# Patient Record
Sex: MALE | Race: WHITE | Employment: OTHER | URBAN - METROPOLITAN AREA
[De-identification: names, ages, dates, MRNs, and addresses within clinical notes are randomized per-mention and may not be internally consistent; named-entity substitution may affect disease eponyms.]

---

## 2017-02-22 ENCOUNTER — TELEPHONE (OUTPATIENT)
Dept: INTERNAL MEDICINE CLINIC | Facility: CLINIC | Age: 77
End: 2017-02-22

## 2017-02-27 RX ORDER — LORAZEPAM 1 MG/1
1 TABLET ORAL EVERY 4 HOURS PRN
Qty: 90 TABLET | Refills: 0 | Status: SHIPPED | OUTPATIENT
Start: 2017-02-27

## 2017-03-06 ENCOUNTER — TELEPHONE (OUTPATIENT)
Dept: INTERNAL MEDICINE CLINIC | Facility: CLINIC | Age: 77
End: 2017-03-06

## 2017-06-08 ENCOUNTER — OFFICE VISIT (OUTPATIENT)
Dept: INTERNAL MEDICINE CLINIC | Facility: CLINIC | Age: 77
End: 2017-06-08

## 2017-06-08 VITALS
WEIGHT: 190 LBS | OXYGEN SATURATION: 98 % | HEART RATE: 103 BPM | BODY MASS INDEX: 30.17 KG/M2 | SYSTOLIC BLOOD PRESSURE: 140 MMHG | TEMPERATURE: 98 F | RESPIRATION RATE: 19 BRPM | HEIGHT: 66.5 IN | DIASTOLIC BLOOD PRESSURE: 80 MMHG

## 2017-06-08 DIAGNOSIS — J30.1 SEASONAL ALLERGIC RHINITIS DUE TO POLLEN: ICD-10-CM

## 2017-06-08 DIAGNOSIS — E88.81 METABOLIC SYNDROME: ICD-10-CM

## 2017-06-08 DIAGNOSIS — E78.1 HYPERTRIGLYCERIDEMIA: ICD-10-CM

## 2017-06-08 DIAGNOSIS — I10 ESSENTIAL HYPERTENSION WITH GOAL BLOOD PRESSURE LESS THAN 140/90: Primary | ICD-10-CM

## 2017-06-08 DIAGNOSIS — K21.9 GERD WITHOUT ESOPHAGITIS: ICD-10-CM

## 2017-06-08 DIAGNOSIS — Z23 NEED FOR VACCINATION FOR STREP PNEUMONIAE: ICD-10-CM

## 2017-06-08 PROCEDURE — 99214 OFFICE O/P EST MOD 30 MIN: CPT | Performed by: INTERNAL MEDICINE

## 2017-06-08 PROCEDURE — 90670 PCV13 VACCINE IM: CPT | Performed by: INTERNAL MEDICINE

## 2017-06-08 PROCEDURE — G0009 ADMIN PNEUMOCOCCAL VACCINE: HCPCS | Performed by: INTERNAL MEDICINE

## 2017-06-08 RX ORDER — AZELASTINE HCL 205.5 UG/1
SPRAY NASAL
Qty: 30 ML | Refills: 1 | Status: SHIPPED | OUTPATIENT
Start: 2017-06-08

## 2017-06-08 RX ORDER — MONTELUKAST SODIUM 10 MG/1
10 TABLET ORAL NIGHTLY
Qty: 90 TABLET | Refills: 3 | Status: SHIPPED | OUTPATIENT
Start: 2017-06-08

## 2017-06-08 NOTE — PROGRESS NOTES
PREVNAR  injection/vaccine  0.5 ML administered IM To the RT DELTOID. Vaccine/injection ordered by physician and documented within chart. Per physician able to administer vaccine/injection. Pt tolerated well. VIS provided and pt had no further questions.  D

## 2017-06-09 PROBLEM — E88.81 METABOLIC SYNDROME: Status: ACTIVE | Noted: 2017-06-09

## 2017-06-09 PROBLEM — E78.1 HYPERTRIGLYCERIDEMIA: Status: ACTIVE | Noted: 2017-06-09

## 2017-06-09 PROBLEM — E88.810 METABOLIC SYNDROME: Status: ACTIVE | Noted: 2017-06-09

## 2017-06-10 NOTE — PROGRESS NOTES
HPI:    Patient ID: Myles Bradford is a 68year old male. HPI  Here for f/u HTN, hay fever, GERD. Home BP : 156/93, 152/96, 133/97. Had been non-compliant with diet. Reports no chest pain, dizzy spells, claudication. Compliant with meds.  .     Nasal 40 mg by mouth daily. Disp:  Rfl:      Allergies:No Known Allergies   PHYSICAL EXAM:   Physical Exam   Constitutional: He is oriented to person, place, and time. He appears well-developed. HENT:   Head: Normocephalic.    Mouth/Throat: Oropharynx is swati Montelukast Sodium 10 MG Oral Tab 90 tablet 3      Sig: Take 1 tablet (10 mg total) by mouth nightly.       Azelastine HCl (ASTEPRO) 0.15 % Nasal Solution 30 mL 1      Si spray to each nostril daily           Imaging & Referrals:  PNEUMOCOCCAL VACC, 13

## 2017-11-22 ENCOUNTER — OFFICE VISIT (OUTPATIENT)
Dept: INTERNAL MEDICINE CLINIC | Facility: CLINIC | Age: 77
End: 2017-11-22

## 2017-11-22 DIAGNOSIS — M15.9 PRIMARY OSTEOARTHRITIS INVOLVING MULTIPLE JOINTS: ICD-10-CM

## 2017-11-22 DIAGNOSIS — E78.1 HYPERTRIGLYCERIDEMIA: ICD-10-CM

## 2017-11-22 DIAGNOSIS — Z00.00 ENCOUNTER FOR ANNUAL HEALTH EXAMINATION: Primary | ICD-10-CM

## 2017-11-22 DIAGNOSIS — I10 ESSENTIAL HYPERTENSION: ICD-10-CM

## 2017-11-22 DIAGNOSIS — Z12.5 PROSTATE CANCER SCREENING: ICD-10-CM

## 2017-11-22 PROCEDURE — 36415 COLL VENOUS BLD VENIPUNCTURE: CPT | Performed by: INTERNAL MEDICINE

## 2017-11-22 PROCEDURE — 80061 LIPID PANEL: CPT | Performed by: INTERNAL MEDICINE

## 2017-11-22 PROCEDURE — G0439 PPPS, SUBSEQ VISIT: HCPCS | Performed by: INTERNAL MEDICINE

## 2017-11-22 PROCEDURE — 85025 COMPLETE CBC W/AUTO DIFF WBC: CPT | Performed by: INTERNAL MEDICINE

## 2017-11-22 PROCEDURE — 80053 COMPREHEN METABOLIC PANEL: CPT | Performed by: INTERNAL MEDICINE

## 2017-11-22 NOTE — PROGRESS NOTES
HPI:   Myles Bradford is a 68year old male who presents for a Medicare Subsequent Annual Wellness visit (Pt already had Initial Annual Wellness). No major complaints except for artthralgia. Denies chest pain. Sob.   Has chronic deafness, use hearing aids 40 MG Oral Capsule Delayed Release Take 40 mg by mouth daily. MEDICAL INFORMATION:   He  has a past medical history of Essential hypertension and Osteoarthritis. He  has no past surgical history on file.     His family history includes Hypertensio septum midline, mucosa normal, no drainage or sinus tenderness   Throat: Lips, mucosa, and tongue normal; teeth and gums normal   Neck: Supple, symmetrical, trachea midline, no adenopathy, thyroid: not enlarged, symmetric, no tenderness/mass/nodules, no ca heavy meals. No food 3-4 hs before bedtime. Keep HOB elevated during sleep. Taper PPI when feeling better      Hyperlipidemia/metabolic syndrome  Keep LDL < 100, trug < 150  On simvastatin, Low fat, low carb diet.  Weight loss beneficial      Anxiety  Psyc Problems?: No      Fall/Risk Assessment          Have you fallen in the last 12 months?: 0-No                                        Fall/Risk Scorin          Depression Screening (PHQ-2/PHQ-9): Over the LAST 2 WEEKS   Little interest or pleasure in do AA>50, > 65 No flowsheet data found.     Prostate Cancer Screening      PSA  Annually Psa today Update Health Maintenance if applicable   Immunizations      Influenza   Orders placed or performed in visit on 09/29/16  -FLU VACC PRSV FREE INC ANTIG

## 2017-11-22 NOTE — PATIENT INSTRUCTIONS
Kendrick Valentin's SCREENING SCHEDULE   Tests on this list are recommended by your physician but may not be covered, or covered at this frequency, by your insurer. Please check with your insurance carrier before scheduling to verify coverage.     PREVENTATIV Colonoscopy Screen   Covered every 10 years- more often if abnormal There are no preventive care reminders to display for this patient.  Update Health Maintenance if applicable    Flex Sigmoidoscopy Screen  Covered every 5 years No results found for [place] with a cut with metal- TD and TDaP Not covered by Medicare Part B) No orders found for this or any previous visit.  This may be covered with your prescription benefits, but Medicare does not cover unless Medically needed    Zoster (Not covered by Medicare P Covered at least every 3 years,           GLUCOSE (P) (mg/dL)   Date Value   09/29/2016 65 (L)   ---------- Medicare covers annually or at 6-month intervals for prediabetic patients        Cardiovascular Disease Screening     Cholesterol, covered every 5 y are no preventive care reminders to display for this patient. No results found for this or any previous visit.    Annually (age 48 or over), DMITRY not paid separately when covered E/M service is provided on same date    Immunizations      Influenza  Covered review and print using their home computer and printer. (the forms are also available in [facility])  [email or web address] org  This link also has information from the [facility] regarding Advance Directives.

## 2017-11-24 VITALS
SYSTOLIC BLOOD PRESSURE: 135 MMHG | BODY MASS INDEX: 31.76 KG/M2 | RESPIRATION RATE: 19 BRPM | TEMPERATURE: 99 F | OXYGEN SATURATION: 98 % | HEIGHT: 66.5 IN | HEART RATE: 76 BPM | DIASTOLIC BLOOD PRESSURE: 80 MMHG | WEIGHT: 200 LBS

## 2017-11-27 PROCEDURE — G0008 ADMIN INFLUENZA VIRUS VAC: HCPCS | Performed by: INTERNAL MEDICINE

## 2017-11-27 PROCEDURE — 90653 IIV ADJUVANT VACCINE IM: CPT | Performed by: INTERNAL MEDICINE

## 2017-11-27 NOTE — PROGRESS NOTES
Patient presented in office today for fasting blood draw. Blood draw successful in left arm  Max:  Cbc,cmp,lipid,psa  D. O.B verified     Flu shot administered in left arm IM  Patient denies allergy to eggs, flu shot before, being sick today, diagnosed wi

## 2018-01-08 DIAGNOSIS — E78.5 HYPERLIPIDEMIA, UNSPECIFIED HYPERLIPIDEMIA TYPE: ICD-10-CM

## 2018-01-08 DIAGNOSIS — I10 ESSENTIAL HYPERTENSION WITH GOAL BLOOD PRESSURE LESS THAN 140/90: ICD-10-CM

## 2018-01-08 DIAGNOSIS — F41.9 ANXIETY: ICD-10-CM

## 2018-01-08 RX ORDER — SIMVASTATIN 20 MG
TABLET ORAL
Qty: 90 TABLET | Refills: 1 | Status: SHIPPED | OUTPATIENT
Start: 2018-01-08 | End: 2018-06-27

## 2018-01-08 RX ORDER — AMLODIPINE BESYLATE 5 MG/1
TABLET ORAL
Qty: 90 TABLET | Refills: 1 | Status: SHIPPED | OUTPATIENT
Start: 2018-01-08

## 2018-01-08 RX ORDER — RANITIDINE 150 MG/1
TABLET ORAL
Qty: 90 TABLET | Refills: 1 | Status: SHIPPED | OUTPATIENT
Start: 2018-01-08

## 2018-01-08 RX ORDER — TRIAMTERENE AND HYDROCHLOROTHIAZIDE 37.5; 25 MG/1; MG/1
CAPSULE ORAL
Qty: 90 CAPSULE | Refills: 1 | Status: SHIPPED | OUTPATIENT
Start: 2018-01-08

## 2018-01-09 RX ORDER — DULOXETIN HYDROCHLORIDE 60 MG/1
CAPSULE, DELAYED RELEASE ORAL
Qty: 90 CAPSULE | Refills: 0 | Status: SHIPPED | OUTPATIENT
Start: 2018-01-09 | End: 2018-02-27

## 2018-02-27 DIAGNOSIS — F41.9 ANXIETY: ICD-10-CM

## 2018-02-27 RX ORDER — DULOXETIN HYDROCHLORIDE 60 MG/1
CAPSULE, DELAYED RELEASE ORAL
Qty: 90 CAPSULE | Refills: 0 | Status: SHIPPED | OUTPATIENT
Start: 2018-02-27

## 2018-06-27 DIAGNOSIS — E78.5 HYPERLIPIDEMIA, UNSPECIFIED HYPERLIPIDEMIA TYPE: ICD-10-CM

## 2018-06-27 DIAGNOSIS — F41.9 ANXIETY: ICD-10-CM

## 2018-06-27 DIAGNOSIS — I10 ESSENTIAL HYPERTENSION WITH GOAL BLOOD PRESSURE LESS THAN 140/90: ICD-10-CM

## 2018-06-27 RX ORDER — RANITIDINE 150 MG/1
TABLET ORAL
Qty: 90 TABLET | OUTPATIENT
Start: 2018-06-27

## 2018-06-27 RX ORDER — SIMVASTATIN 20 MG
TABLET ORAL
Qty: 90 TABLET | Refills: 1 | Status: SHIPPED | OUTPATIENT
Start: 2018-06-27

## 2018-06-27 RX ORDER — TRIAMTERENE AND HYDROCHLOROTHIAZIDE 37.5; 25 MG/1; MG/1
CAPSULE ORAL
Qty: 90 CAPSULE | OUTPATIENT
Start: 2018-06-27

## 2018-06-27 RX ORDER — AMLODIPINE BESYLATE 5 MG/1
TABLET ORAL
Qty: 90 TABLET | OUTPATIENT
Start: 2018-06-27

## 2018-06-27 RX ORDER — DULOXETIN HYDROCHLORIDE 60 MG/1
CAPSULE, DELAYED RELEASE ORAL
Qty: 90 CAPSULE | OUTPATIENT
Start: 2018-06-27

## 2018-10-25 ENCOUNTER — TELEPHONE (OUTPATIENT)
Dept: INTERNAL MEDICINE CLINIC | Facility: CLINIC | Age: 78
End: 2018-10-25

## 2018-11-16 ENCOUNTER — TELEPHONE (OUTPATIENT)
Dept: INTERNAL MEDICINE CLINIC | Facility: CLINIC | Age: 78
End: 2018-11-16

## 2019-08-05 NOTE — MR AVS SNAPSHOT
[facility] at [address] [place] [address] Road [address] [phone]               Thank you for choosing us for your health care visit with Soheila Menon MD.  We are glad to serve you and happy to provide Instructions and Information about Your Health     None      Allergies as of Jun 08, 2017     No Known Allergies                Today's Vital Signs     BP Pulse Temp Height Weight BMI    140/80 mmHg 103 98.3 °F (36.8 °C) (Oral) 66.5\" 190 lb 30.21 kg/m2 - Azelastine HCl 0.15 % Soln            Immunizations Administered in the Office Today     Pneumococcal (Prevnar 13)       [employer]     Call the [employer] for assistance with your inactive [employer] account    If you have questions, you can call 21 [phone] Visit [facility] online at  [email or web address]

## 2019-12-30 RX ORDER — RANITIDINE 150 MG/1
TABLET ORAL
Qty: 90 TABLET | Refills: 1 | OUTPATIENT
Start: 2019-12-30

## 2020-10-08 ENCOUNTER — IMMUNIZATION (OUTPATIENT)
Dept: FAMILY MEDICINE CLINIC | Facility: CLINIC | Age: 80
End: 2020-10-08
Payer: MEDICARE

## 2020-10-08 PROCEDURE — G0008 ADMIN INFLUENZA VIRUS VAC: HCPCS | Performed by: NURSE PRACTITIONER

## 2020-10-08 PROCEDURE — 90662 IIV NO PRSV INCREASED AG IM: CPT | Performed by: NURSE PRACTITIONER

## 2023-08-17 ENCOUNTER — HOSPITAL ENCOUNTER (OUTPATIENT)
Age: 83
Discharge: HOME | End: 2023-08-17
Payer: MEDICARE

## 2023-08-17 ENCOUNTER — APPOINTMENT (OUTPATIENT)
Dept: GENERAL RADIOLOGY | Age: 83
End: 2023-08-17
Attending: NURSE PRACTITIONER
Payer: MEDICARE

## 2023-08-17 VITALS
HEART RATE: 100 BPM | SYSTOLIC BLOOD PRESSURE: 155 MMHG | TEMPERATURE: 97 F | RESPIRATION RATE: 20 BRPM | OXYGEN SATURATION: 96 % | DIASTOLIC BLOOD PRESSURE: 88 MMHG

## 2023-08-17 DIAGNOSIS — M79.671 RIGHT FOOT PAIN: Primary | ICD-10-CM

## 2023-08-17 LAB
#MXD IC: 1 X10ˆ3/UL (ref 0.1–1)
BUN BLD-MCNC: 31 MG/DL (ref 7–18)
CHLORIDE BLD-SCNC: 99 MMOL/L (ref 98–112)
CO2 BLD-SCNC: 27 MMOL/L (ref 21–32)
CREAT BLD-MCNC: 1.3 MG/DL
EGFRCR SERPLBLD CKD-EPI 2021: 55 ML/MIN/1.73M2 (ref 60–?)
GLUCOSE BLD-MCNC: 84 MG/DL (ref 70–99)
HCT VFR BLD AUTO: 48.2 %
HCT VFR BLD CALC: 50 %
HGB BLD-MCNC: 17.4 G/DL
ISTAT IONIZED CALCIUM FOR CHEM 8: 1.11 MMOL/L (ref 1.12–1.32)
LYMPHOCYTES # BLD AUTO: 1.5 X10ˆ3/UL (ref 1–4)
LYMPHOCYTES NFR BLD AUTO: 14.2 %
MCH RBC QN AUTO: 34.3 PG (ref 26–34)
MCHC RBC AUTO-ENTMCNC: 36.1 G/DL (ref 31–37)
MCV RBC AUTO: 94.9 FL (ref 80–100)
MIXED CELL %: 9.1 %
NEUTROPHILS # BLD AUTO: 8.4 X10ˆ3/UL (ref 1.5–7.7)
NEUTROPHILS NFR BLD AUTO: 76.7 %
PLATELET # BLD AUTO: 246 X10ˆ3/UL (ref 150–450)
POTASSIUM BLD-SCNC: 3.6 MMOL/L (ref 3.6–5.1)
RBC # BLD AUTO: 5.08 X10ˆ6/UL
SODIUM BLD-SCNC: 140 MMOL/L (ref 136–145)
URATE SERPL-MCNC: 8.5 MG/DL
WBC # BLD AUTO: 10.9 X10ˆ3/UL (ref 4–11)

## 2023-08-17 PROCEDURE — 80047 BASIC METABLC PNL IONIZED CA: CPT | Performed by: NURSE PRACTITIONER

## 2023-08-17 PROCEDURE — 85025 COMPLETE CBC W/AUTO DIFF WBC: CPT | Performed by: NURSE PRACTITIONER

## 2023-08-17 PROCEDURE — 73630 X-RAY EXAM OF FOOT: CPT | Performed by: NURSE PRACTITIONER

## 2023-08-17 PROCEDURE — 99203 OFFICE O/P NEW LOW 30 MIN: CPT | Performed by: NURSE PRACTITIONER

## 2023-08-17 RX ORDER — CETIRIZINE HYDROCHLORIDE 10 MG/1
10 TABLET ORAL DAILY
COMMUNITY

## 2023-08-17 RX ORDER — PREDNISONE 20 MG/1
40 TABLET ORAL DAILY
Qty: 10 TABLET | Refills: 0 | Status: SHIPPED | OUTPATIENT
Start: 2023-08-17 | End: 2023-08-22

## 2023-08-17 RX ORDER — DONEPEZIL HYDROCHLORIDE 5 MG/1
5 TABLET, FILM COATED ORAL NIGHTLY
COMMUNITY

## 2023-08-17 RX ORDER — LEVOTHYROXINE SODIUM 0.03 MG/1
25 TABLET ORAL
COMMUNITY

## 2023-08-17 NOTE — ED INITIAL ASSESSMENT (HPI)
Per wife pt c/o right foot pain. Pt has swelling to his great toe of his right foot. Pt has had symptoms for 2 days. Pt has more pain when walking.

## 2024-08-17 ENCOUNTER — HOSPITAL ENCOUNTER (EMERGENCY)
Facility: HOSPITAL | Age: 84
Discharge: HOME | End: 2024-08-17
Attending: EMERGENCY MEDICINE
Payer: MEDICARE

## 2024-08-17 ENCOUNTER — APPOINTMENT (OUTPATIENT)
Dept: CT IMAGING | Facility: HOSPITAL | Age: 84
End: 2024-08-17
Attending: EMERGENCY MEDICINE
Payer: MEDICARE

## 2024-08-17 VITALS
OXYGEN SATURATION: 94 % | RESPIRATION RATE: 20 BRPM | WEIGHT: 160 LBS | BODY MASS INDEX: 25.11 KG/M2 | SYSTOLIC BLOOD PRESSURE: 137 MMHG | DIASTOLIC BLOOD PRESSURE: 93 MMHG | TEMPERATURE: 98 F | HEART RATE: 104 BPM | HEIGHT: 67 IN

## 2024-08-17 DIAGNOSIS — S01.01XA LACERATION OF SCALP, INITIAL ENCOUNTER: ICD-10-CM

## 2024-08-17 DIAGNOSIS — S09.90XA INJURY OF HEAD, INITIAL ENCOUNTER: Primary | ICD-10-CM

## 2024-08-17 PROCEDURE — 72125 CT NECK SPINE W/O DYE: CPT | Performed by: EMERGENCY MEDICINE

## 2024-08-17 PROCEDURE — 70450 CT HEAD/BRAIN W/O DYE: CPT | Performed by: EMERGENCY MEDICINE

## 2024-08-17 PROCEDURE — 99284 EMERGENCY DEPT VISIT MOD MDM: CPT

## 2024-08-17 PROCEDURE — 12002 RPR S/N/AX/GEN/TRNK2.6-7.5CM: CPT

## 2024-08-17 PROCEDURE — 99285 EMERGENCY DEPT VISIT HI MDM: CPT

## 2024-08-17 RX ORDER — ZOLPIDEM TARTRATE 5 MG/1
1 TABLET ORAL NIGHTLY PRN
COMMUNITY
Start: 2024-08-14

## 2024-08-17 RX ORDER — MEMANTINE HYDROCHLORIDE 10 MG/1
10 TABLET ORAL 2 TIMES DAILY
COMMUNITY
Start: 2023-07-26

## 2024-08-17 RX ORDER — POTASSIUM CHLORIDE 1.5 G/1.58G
20 POWDER, FOR SOLUTION ORAL DAILY
COMMUNITY

## 2024-08-17 NOTE — ED INITIAL ASSESSMENT (HPI)
Patient to ED via EMS after falling at [facility] Per EMS, patient took 2 sleeping pills and was walking backwards to the bed with his walker and fell backwards hitting his head. No LOC no blood thinners.    C collar applied en route

## 2024-08-17 NOTE — ED PROVIDER NOTES
Patient Seen in: [facility] Emergency Department      History     Chief Complaint   Patient presents with    Fall     Stated Complaint: fall    Subjective:   HPI    84-year-old male comes to the hospital after having had a fall that was witnessed.  The patient has dementia and is a poor historian.  I interviewed the wife as well as the caretaker for history.  Is reported today that he was getting ready go to bed, he took a sleeping pill, was using his walker and trying to back up into bed when he lost his balance and fell hitting his head against the nightstand.  He did sustain a laceration to the posterior scalp.  He is denying any headaches.  He has no loss conscious.  He is denying any specific complaints including pain, numbness, weakness, nausea or any other complaints at this time.    Objective:   No pertinent past medical history.            No pertinent past surgical history.              No pertinent social history.            Review of Systems    Positive for stated Chief Complaint: Fall    Other systems are as noted in HPI.  Constitutional and vital signs reviewed.      All other systems reviewed and negative except as noted above.    Physical Exam     ED Triage Vitals [08/17/24 0121]   BP (!) 156/107   Pulse 108   Resp 22   Temp 97.8 °F (36.6 °C)   Temp src Temporal   SpO2 94 %   O2 Device None (Room air)       Current Vitals:   Vital Signs  BP: (!) 137/93  Pulse: 104  Resp: 20  Temp: 97.8 °F (36.6 °C)  Temp src: Temporal    Oxygen Therapy  SpO2: 94 %  O2 Device: None (Room air)            Physical Exam    HEENT : NC, posterior scalp laceration 3 cm noted, EOMI, PEERL,  neck in c-collar also mild.  Cervical muscular tenderness, no JVD, trachea midline, No LAD  Heart: S1S2 normal. No murmurs, regular rate and rhythm, nontender  Lungs: Clear to auscultation bilaterally  Abdomen: Soft nontender nondistended normal active bowel sounds without rebound, guarding or masses noted  Back nontender without  CVA tenderness  Extremity no clubbing, cyanosis or edema noted.  Full range of motion noted without tenderness  Neuro: No focal deficits noted    All measures to prevent infection transmission during my interaction with the patient were taken.  The patient was already wearing droplet mask on my arrival to the room.  Personal protective equipment including a droplet mask as well as gloves were worn throughout the duration of my exam.  Hand washing was performed prior to and after the exam.  Stethoscope and equipment used during my examination was cleaned with a super Sani cloth germicidal wipe following the exam.    ED Course   Labs Reviewed - No data to display       ED Course as of 08/17/24 0303  ------------------------------------------------------------  Time: 08/17 0300  Comment: While here the patient had a CT of the brain that I first interpreted no acute hemorrhage.  Read the radiology report as well.  The patient CT C-spine was negative and his c-collar was removed.     Laceration was anesthetized with lidocaine locally.  The wound was cleansed and irrigated copiously.  There was no visible foreign body within the wound. The wound was closed using a simple closure with staples.  The quality of the closure was excellent           MDM      Differential diagnosis include intracranial hemorrhage, cervical fracture but not limited such.  The patient's imaging here is normal.  His scalp laceration was repaired at this time the patient was discharged home with family for further care    Patient was screened and evaluated during this visit.   As a treating physician attending to the patient, I determined, within reasonable clinical confidence and prior to discharge, that an emergency medical condition was not or was no longer present.  There was no indication for further evaluation, treatment or admission on an emergency basis.       The usual and customary discharge instuctions were discussed given the patient's  ER course.  We discussed signs and symptoms that should prompt the patient's immediate return to the emergency department.   Reasonable over the counter and prescription treatment options and Physician follow up plan was discussed.       The patient is discharged in good condition.         This note was prepared using Dragon Medical voice recognition dictation software.  As a result errors may occur.  When identified to these areas have been corrected.  While every attempt is made to correct errors during dictation discrepancies may still exist.  Please contact if there are any errors.                                   Medical Decision Making      Disposition and Plan     Clinical Impression:  1. Injury of head, initial encounter    2. Laceration of scalp, initial encounter         Disposition:  Discharge  8/17/2024  3:01 am    Follow-up:  Sanket Saucedo MD  [address] [facility] [address]  [phone]    Schedule an appointment as soon as possible for a visit in 2 day(s)  Have staples removed in 7 to 10 days.          Medications Prescribed:  Current Discharge Medication List

## 2024-11-04 RX ORDER — POTASSIUM CHLORIDE 1.5 G/1.58G
20 POWDER, FOR SOLUTION ORAL DAILY
Qty: 90 PACKET | Refills: 0 | Status: SHIPPED | OUTPATIENT
Start: 2024-11-04

## 2024-11-04 NOTE — TELEPHONE ENCOUNTER
Kendrick's wife Coral called and patient needs Potassium but they need additional information concerning this. I believe they use [employer] [facility].

## 2024-11-04 NOTE — TELEPHONE ENCOUNTER
Not protocol   New pt (Coral Valentin's spouse) NOV 11/26/24   On Triamterene-hydrochlorothiazide 37.5-25 mg   Last K 4.2 on 8/29/24     Pended

## 2024-11-26 ENCOUNTER — OFFICE VISIT (OUTPATIENT)
Dept: FAMILY MEDICINE CLINIC | Facility: CLINIC | Age: 84
End: 2024-11-26
Payer: MEDICARE

## 2024-11-26 VITALS
SYSTOLIC BLOOD PRESSURE: 154 MMHG | WEIGHT: 193.5 LBS | HEIGHT: 67 IN | HEART RATE: 94 BPM | OXYGEN SATURATION: 94 % | DIASTOLIC BLOOD PRESSURE: 82 MMHG | BODY MASS INDEX: 30.37 KG/M2 | RESPIRATION RATE: 20 BRPM

## 2024-11-26 DIAGNOSIS — F41.9 ANXIETY: ICD-10-CM

## 2024-11-26 DIAGNOSIS — K21.9 GERD WITHOUT ESOPHAGITIS: ICD-10-CM

## 2024-11-26 DIAGNOSIS — R73.9 HYPERGLYCEMIA: ICD-10-CM

## 2024-11-26 DIAGNOSIS — Z87.39 HISTORY OF GOUT: ICD-10-CM

## 2024-11-26 DIAGNOSIS — M15.0 PRIMARY OSTEOARTHRITIS INVOLVING MULTIPLE JOINTS: ICD-10-CM

## 2024-11-26 DIAGNOSIS — I10 ESSENTIAL HYPERTENSION: Primary | ICD-10-CM

## 2024-11-26 DIAGNOSIS — D75.1 ERYTHROCYTOSIS: ICD-10-CM

## 2024-11-26 DIAGNOSIS — G30.9 SEVERE ALZHEIMER'S DEMENTIA WITHOUT BEHAVIORAL DISTURBANCE, PSYCHOTIC DISTURBANCE, MOOD DISTURBANCE, OR ANXIETY, UNSPECIFIED TIMING OF DEMENTIA ONSET (HCC): ICD-10-CM

## 2024-11-26 DIAGNOSIS — Z23 NEED FOR VACCINATION: ICD-10-CM

## 2024-11-26 DIAGNOSIS — F02.C0 SEVERE ALZHEIMER'S DEMENTIA WITHOUT BEHAVIORAL DISTURBANCE, PSYCHOTIC DISTURBANCE, MOOD DISTURBANCE, OR ANXIETY, UNSPECIFIED TIMING OF DEMENTIA ONSET (HCC): ICD-10-CM

## 2024-11-26 DIAGNOSIS — E78.1 HYPERTRIGLYCERIDEMIA: ICD-10-CM

## 2024-11-26 DIAGNOSIS — E03.9 ACQUIRED HYPOTHYROIDISM: ICD-10-CM

## 2024-11-26 DIAGNOSIS — J30.1 SEASONAL ALLERGIC RHINITIS DUE TO POLLEN: ICD-10-CM

## 2024-11-26 DIAGNOSIS — G62.9 PERIPHERAL POLYNEUROPATHY: ICD-10-CM

## 2024-11-26 DIAGNOSIS — E53.8 VITAMIN B12 DEFICIENCY: ICD-10-CM

## 2024-11-26 LAB
ALBUMIN SERPL-MCNC: 4.3 G/DL (ref 3.2–4.8)
ALBUMIN/GLOB SERPL: 1.3 {RATIO} (ref 1–2)
ALP LIVER SERPL-CCNC: 97 U/L
ALT SERPL-CCNC: 18 U/L
ANION GAP SERPL CALC-SCNC: 9 MMOL/L (ref 0–18)
AST SERPL-CCNC: 21 U/L (ref ?–34)
BASOPHILS # BLD AUTO: 0.06 X10(3) UL (ref 0–0.2)
BASOPHILS NFR BLD AUTO: 0.6 %
BILIRUB SERPL-MCNC: 0.3 MG/DL (ref 0.2–1.1)
BUN BLD-MCNC: 32 MG/DL (ref 9–23)
CALCIUM BLD-MCNC: 9.9 MG/DL (ref 8.7–10.4)
CHLORIDE SERPL-SCNC: 109 MMOL/L (ref 98–112)
CO2 SERPL-SCNC: 22 MMOL/L (ref 21–32)
CREAT BLD-MCNC: 1.01 MG/DL
EGFRCR SERPLBLD CKD-EPI 2021: 73 ML/MIN/1.73M2 (ref 60–?)
EOSINOPHIL # BLD AUTO: 0.26 X10(3) UL (ref 0–0.7)
EOSINOPHIL NFR BLD AUTO: 2.6 %
ERYTHROCYTE [DISTWIDTH] IN BLOOD BY AUTOMATED COUNT: 14.7 %
EST. AVERAGE GLUCOSE BLD GHB EST-MCNC: 94 MG/DL (ref 68–126)
FASTING STATUS PATIENT QL REPORTED: NO
GLOBULIN PLAS-MCNC: 3.3 G/DL (ref 2–3.5)
GLUCOSE BLD-MCNC: 112 MG/DL (ref 70–99)
HBA1C MFR BLD: 4.9 % (ref ?–5.7)
HCT VFR BLD AUTO: 44.3 %
HGB BLD-MCNC: 16.6 G/DL
IMM GRANULOCYTES # BLD AUTO: 0.04 X10(3) UL (ref 0–1)
IMM GRANULOCYTES NFR BLD: 0.4 %
LYMPHOCYTES # BLD AUTO: 1.82 X10(3) UL (ref 1–4)
LYMPHOCYTES NFR BLD AUTO: 18.1 %
MCH RBC QN AUTO: 34.7 PG (ref 26–34)
MCHC RBC AUTO-ENTMCNC: 37.5 G/DL (ref 31–37)
MCV RBC AUTO: 92.7 FL
MONOCYTES # BLD AUTO: 0.79 X10(3) UL (ref 0.1–1)
MONOCYTES NFR BLD AUTO: 7.8 %
NEUTROPHILS # BLD AUTO: 7.1 X10 (3) UL (ref 1.5–7.7)
NEUTROPHILS # BLD AUTO: 7.1 X10(3) UL (ref 1.5–7.7)
NEUTROPHILS NFR BLD AUTO: 70.5 %
OSMOLALITY SERPL CALC.SUM OF ELEC: 298 MOSM/KG (ref 275–295)
PLATELET # BLD AUTO: 312 10(3)UL (ref 150–450)
POTASSIUM SERPL-SCNC: 3.8 MMOL/L (ref 3.5–5.1)
PROT SERPL-MCNC: 7.6 G/DL (ref 5.7–8.2)
RBC # BLD AUTO: 4.78 X10(6)UL
SODIUM SERPL-SCNC: 140 MMOL/L (ref 136–145)
T4 FREE SERPL-MCNC: 1.1 NG/DL (ref 0.8–1.7)
TSI SER-ACNC: 6.18 UIU/ML (ref 0.55–4.78)
VIT B12 SERPL-MCNC: 443 PG/ML (ref 211–911)
WBC # BLD AUTO: 10.1 X10(3) UL (ref 4–11)

## 2024-11-26 PROCEDURE — 90662 IIV NO PRSV INCREASED AG IM: CPT | Performed by: FAMILY MEDICINE

## 2024-11-26 PROCEDURE — 83036 HEMOGLOBIN GLYCOSYLATED A1C: CPT | Performed by: FAMILY MEDICINE

## 2024-11-26 PROCEDURE — G0008 ADMIN INFLUENZA VIRUS VAC: HCPCS | Performed by: FAMILY MEDICINE

## 2024-11-26 PROCEDURE — 99499 UNLISTED E&M SERVICE: CPT | Performed by: FAMILY MEDICINE

## 2024-11-26 PROCEDURE — 84439 ASSAY OF FREE THYROXINE: CPT | Performed by: FAMILY MEDICINE

## 2024-11-26 PROCEDURE — 99204 OFFICE O/P NEW MOD 45 MIN: CPT | Performed by: FAMILY MEDICINE

## 2024-11-26 PROCEDURE — 80053 COMPREHEN METABOLIC PANEL: CPT | Performed by: FAMILY MEDICINE

## 2024-11-26 PROCEDURE — 82607 VITAMIN B-12: CPT | Performed by: FAMILY MEDICINE

## 2024-11-26 PROCEDURE — 85025 COMPLETE CBC W/AUTO DIFF WBC: CPT | Performed by: FAMILY MEDICINE

## 2024-11-26 PROCEDURE — 84443 ASSAY THYROID STIM HORMONE: CPT | Performed by: FAMILY MEDICINE

## 2024-11-26 RX ORDER — POTASSIUM CHLORIDE 1500 MG/1
20 TABLET, EXTENDED RELEASE ORAL DAILY
Qty: 90 TABLET | Refills: 1 | Status: SHIPPED | OUTPATIENT
Start: 2024-11-26

## 2024-11-26 RX ORDER — DONEPEZIL HYDROCHLORIDE 5 MG/1
5 TABLET, FILM COATED ORAL NIGHTLY
Qty: 30 TABLET | Refills: 3 | Status: SHIPPED | OUTPATIENT
Start: 2024-11-26

## 2024-11-26 RX ORDER — AMLODIPINE BESYLATE 10 MG/1
10 TABLET ORAL DAILY
Qty: 30 TABLET | Refills: 3 | Status: SHIPPED | OUTPATIENT
Start: 2024-11-26

## 2024-11-26 RX ORDER — ALLOPURINOL 100 MG/1
100 TABLET ORAL DAILY
COMMUNITY
Start: 2024-10-03

## 2024-11-26 RX ORDER — TRAZODONE HYDROCHLORIDE 50 MG/1
50 TABLET, FILM COATED ORAL NIGHTLY
Qty: 30 TABLET | Refills: 2 | Status: CANCELLED | OUTPATIENT
Start: 2024-11-26

## 2024-11-26 RX ORDER — PREDNISOLONE ACETATE 10 MG/ML
SUSPENSION/ DROPS OPHTHALMIC
COMMUNITY
Start: 2023-10-06 | End: 2024-11-26

## 2024-11-26 RX ORDER — MOXIFLOXACIN 5 MG/ML
SOLUTION/ DROPS OPHTHALMIC
COMMUNITY
Start: 2023-10-06 | End: 2024-11-26

## 2024-11-26 RX ORDER — DULOXETIN HYDROCHLORIDE 30 MG/1
30 CAPSULE, DELAYED RELEASE ORAL DAILY
Qty: 30 CAPSULE | Refills: 3 | Status: SHIPPED | OUTPATIENT
Start: 2024-11-26

## 2024-11-26 RX ORDER — MUPIROCIN 20 MG/G
OINTMENT TOPICAL
COMMUNITY
Start: 2024-06-20 | End: 2024-11-26

## 2024-11-27 NOTE — PROGRESS NOTES
Kendrick Valentin is a 84 year old male.  HPI:   Pt. Is here to establish care.  HTN -- on amlodipine 5 mg daily; triamterene/hydrochlorothiazide, potassium  GERD -- stable on omeprazole 20 mg daily  Gout -- on allopurinol  Neuropathy -- stable on duloxetine 60 mg daily and does not know why he is on it  Hypothyroid -- stable on levothyroxine 25 mcg daily  Dyslipidemia/hypertriglyceridemia -- on simvastatin  Allergies -- on astepro and zyrtec  and singulair  Alzheimer's -- on memantidine 10 mg BID; not taking donepezil  Erythropoiesis -- pt. Has been high for years per Coral and she states his sister has the same thing and had bone marrow bx and they did not find anything; deferred going to hematology for it  Vitamin B12 def -- stable  Insomnia -- Coral states she is not sleeping at night and will get up and go to the fridge and he needs something to sleep; she gave him her quetiapine 50 mg and it helped some but he still would get up  Meds reviewed.      Current Outpatient Medications   Medication Sig Dispense Refill    allopurinol 100 MG Oral Tab Take 1 tablet (100 mg total) by mouth daily.      omeprazole 20 MG Oral Capsule Delayed Release       potassium chloride 20 MEQ Oral Tab CR Take 1 tablet (20 mEq total) by mouth daily. 90 tablet 1    DULoxetine 30 MG Oral Cap DR Particles Take 1 capsule (30 mg total) by mouth daily. 30 capsule 3    amLODIPine 10 MG Oral Tab Take 1 tablet (10 mg total) by mouth daily. 30 tablet 3    donepezil (ARICEPT) 5 MG Oral Tab Take 1 tablet (5 mg total) by mouth nightly. 30 tablet 3    potassium chloride 20 MEQ Oral Powd Pack Take 20 mEq by mouth daily. 90 packet 0    memantine 10 MG Oral Tab Take 1 tablet (10 mg total) by mouth 2 (two) times daily.      levothyroxine 25 MCG Oral Tab Take 1 tablet (25 mcg total) by mouth before breakfast.      cetirizine 10 MG Oral Tab Take 1 tablet (10 mg total) by mouth daily.      SIMVASTATIN 20 MG Oral Tab TAKE 1 TABLET BY MOUTH  DAILY 90 tablet 1     AMLODIPINE BESYLATE 5 MG Oral Tab TAKE 1 TABLET BY MOUTH  DAILY 90 tablet 1    Azelastine HCl (ASTEPRO) 0.15 % Nasal Solution 1 spray to each nostril daily 30 mL 1    zolpidem 5 MG Oral Tab Take 1 tablet (5 mg total) by mouth nightly as needed. (Patient not taking: Reported on 11/26/2024)      TRIAMTERENE-HCTZ 37.5-25 MG Oral Cap TAKE 1 CAPSULE BY MOUTH  EVERY MORNING 90 capsule 1    Montelukast Sodium 10 MG Oral Tab Take 1 tablet (10 mg total) by mouth nightly. (Patient not taking: Reported on 11/26/2024) 90 tablet 3    LORazepam 1 MG Oral Tab Take 1 tablet (1 mg total) by mouth every 4 (four) hours as needed for Anxiety. (Patient not taking: Reported on 11/26/2024) 90 tablet 0      Allergies[1]   Past Medical History:    Dementia (HCC)    Depression    Essential hypertension    Osteoarthritis      Social History:  Social History     Socioeconomic History    Marital status:    Tobacco Use    Smoking status: Never    Smokeless tobacco: Never   Vaping Use    Vaping status: Never Used   Substance and Sexual Activity    Alcohol use: No    Drug use: No        Results for orders placed or performed during the hospital encounter of 08/17/23   Uric Acid    Collection Time: 08/17/23  4:21 PM   Result Value Ref Range    Uric Acid 8.5 (H) 3.5 - 7.2 mg/dL   POCT CBC    Collection Time: 08/17/23  4:21 PM   Result Value Ref Range    WBC IC 10.9 4.0 - 11.0 x10ˆ3/uL    RBC IC 5.08 3.80 - 5.80 X10ˆ6/uL    HGB IC 17.4 13.0 - 17.5 g/dL    HCT IC 48.2 39.0 - 53.0 %    MCV IC 94.9 80.0 - 100.0 fL    MCH IC 34.3 (H) 26.0 - 34.0 pg    MCHC IC 36.1 31.0 - 37.0 g/dL    PLT .0 150.0 - 450.0 X10ˆ3/uL    # Neutrophil 8.4 (H) 1.5 - 7.7 X10ˆ3/uL    # Lymphocyte 1.5 1.0 - 4.0 X10ˆ3/uL    # Mixed Cells 1.0 0.1 - 1.0 X10ˆ3/uL    Neutrophil % 76.7 %    Lymphocyte % 14.2 %    Mixed Cell % 9.1 %   POCT ISTAT chem8 cartridge    Collection Time: 08/17/23  4:30 PM   Result Value Ref Range    ISTAT Sodium 140 136 - 145 mmol/L    ISTAT BUN 31  (H) 7 - 18 mg/dL    ISTAT Potassium 3.6 3.6 - 5.1 mmol/L    ISTAT Chloride 99 98 - 112 mmol/L    ISTAT Ionized Calcium 1.11 (L) 1.12 - 1.32 mmol/L    ISTAT Hematocrit 50 37 - 53 %    ISTAT Glucose 84 70 - 99 mg/dL    ISTAT TCO2 27 21 - 32 mmol/L    ISTAT Creatinine 1.30 0.70 - 1.30 mg/dL    eGFR-Cr 55 (L) >=60 mL/min/1.73m2       REVIEW OF SYSTEMS:   GENERAL: feels well otherwise  SKIN: denies any unusual skin lesions  EYES:denies blurred vision or double vision  HEENT: denies nasal congestion, sinus pain or ST  LUNGS: denies shortness of breath with exertion  CARDIOVASCULAR: denies chest pain on exertion  GI: denies abdominal pain,denies heartburn  : denies dysuria  MUSCULOSKELETAL: denies back pain  NEURO: denies headaches  PSYCHE: denies depression or anxiety  HEMATOLOGIC: denies hx of anemia  ENDOCRINE: denies thyroid history  ALL/ASTHMA: denies hx of allergy or asthma    EXAM:   /82 (BP Location: Left arm, Patient Position: Sitting, Cuff Size: adult)   Pulse 94   Resp 20   Ht 5' 7\" (1.702 m)   Wt 193 lb 8 oz (87.8 kg)   SpO2 94%   BMI 30.31 kg/m²   GENERAL: well developed, well nourished,in no apparent distress  PSYCHE: normal mood and affect  SKIN: no rashes,no suspicious lesions  HEENT: atraumatic, normocephalic,ears and throat are clear  NECK: supple,no adenopathy,no bruits, no thyromegaly or nodule.  LUNGS: clear to auscultation  CARDIO: RRR without murmur  GI: good BS's,no masses, HSM or tenderness  EXTREMITIES: no cyanosis, clubbing or edema    ASSESSMENT AND PLAN:     Encounter Diagnoses   Name Primary?    Essential hypertension Yes    Acquired hypothyroidism     Hyperglycemia     Vitamin B12 deficiency     Erythrocytosis     Anxiety     Seasonal allergic rhinitis due to pollen     GERD without esophagitis     Primary osteoarthritis involving multiple joints     Hypertriglyceridemia     Severe Alzheimer's dementia without behavioral disturbance, psychotic disturbance, mood disturbance, or  anxiety, unspecified timing of dementia onset (HCC)     Peripheral polyneuropathy     History of gout     Need for vaccination        Orders Placed This Encounter   Procedures    Free T4, (Free Thyroxine)    Assay, Thyroid Stim Hormone    Hemoglobin A1C    Comp Metabolic Panel (14)    CBC With Differential With Platelet    Vitamin B12    INFLUENZA VAC HIGH DOSE PRSV FREE       Meds & Refills for this Visit:  Requested Prescriptions     Signed Prescriptions Disp Refills    potassium chloride 20 MEQ Oral Tab CR 90 tablet 1     Sig: Take 1 tablet (20 mEq total) by mouth daily.    DULoxetine 30 MG Oral Cap DR Particles 30 capsule 3     Sig: Take 1 capsule (30 mg total) by mouth daily.    amLODIPine 10 MG Oral Tab 30 tablet 3     Sig: Take 1 tablet (10 mg total) by mouth daily.    donepezil (ARICEPT) 5 MG Oral Tab 30 tablet 3     Sig: Take 1 tablet (5 mg total) by mouth nightly.       Imaging & Consults:  INFLUENZA VAC HIGH DOSE PRSV FREE    Given a flu shot.  Advised COVID and shingrix.  Meds refilled.  Decrease duloxetine since not sure if it helping.  Start trazodone 50 mg nightly for sleep.    HTN -- on amlodipine 5 mg daily; triamterene/hydrochlorothiazide, potassium  -- increase amlodipine to 10 mg daily and monitor BP  GERD -- stable on omeprazole 20 mg daily  Gout -- on allopurinol; uric acid wnl  Neuropathy -- stable on duloxetine 60 mg daily and does not know why he is on it  Hypothyroid -- stable on levothyroxine 25 mcg daily  Dyslipidemia/hypertriglyceridemia -- on simvastatin  Allergies -- on astepro and zyrtec  and singulair  Alzheimer's -- on memantidine 10 mg BID; start donepezil 5 mg nightly and monitor memory  Erythropoiesis -- pt. Has been high for years per Coral and she states his sister has the same thing and had bone marrow bx and they did not find anything; deferred going to hematology for it  Vitamin B12 def -- stable  Insomnia -- will try trazodone 50 mg nightly  Monitor symptoms.        The  patient indicates understanding of these issues and agrees to the plan.  Return in about 6 weeks (around 1/7/2025) for HTN check.  .         [1]   Allergies  Allergen Reactions    Bee Venom UNKNOWN    Dust Mite Extract UNKNOWN    Seasonal UNKNOWN

## 2024-12-02 DIAGNOSIS — E03.9 HYPOTHYROIDISM, UNSPECIFIED TYPE: Primary | ICD-10-CM

## 2024-12-02 RX ORDER — LEVOTHYROXINE SODIUM 50 UG/1
50 TABLET ORAL
Qty: 90 TABLET | Refills: 0 | Status: SHIPPED | OUTPATIENT
Start: 2024-12-02

## 2024-12-30 ENCOUNTER — OFFICE VISIT (OUTPATIENT)
Dept: FAMILY MEDICINE CLINIC | Facility: CLINIC | Age: 84
End: 2024-12-30
Payer: MEDICARE

## 2024-12-30 DIAGNOSIS — Z02.9 ENCOUNTERS FOR UNSPECIFIED ADMINISTRATIVE PURPOSE: Primary | ICD-10-CM

## 2024-12-30 RX ORDER — POTASSIUM CHLORIDE 1.5 G/1.58G
POWDER, FOR SOLUTION ORAL
Qty: 90 EACH | Refills: 3 | OUTPATIENT
Start: 2024-12-30

## 2025-01-14 NOTE — TELEPHONE ENCOUNTER
Patient is calling for a refill on his donepezil (ARICEPT) 5 MG Oral Tab [ patient wife reports she has been giving him her quitiapine and is asking for a prescription for him and says she talked to dr nicolas about it but then says she meant to but can't keep track of all this

## 2025-01-14 NOTE — TELEPHONE ENCOUNTER
Kendrick still has refills available for donepezil at [facility], will notify Coral    She is requesting rx for Seroquel for Kendrick to help him sleep as she has been giving him hers   Was discussed at last appt, was advised to start trazodone but not on med list

## 2025-01-15 RX ORDER — TRAZODONE HYDROCHLORIDE 50 MG/1
50 TABLET, FILM COATED ORAL NIGHTLY
Qty: 30 TABLET | Refills: 2 | Status: SHIPPED | OUTPATIENT
Start: 2025-01-15

## 2025-01-15 NOTE — TELEPHONE ENCOUNTER
Stop Seroquel.  Start trazodone 50 mg nightly and dispense 30 with 2 refills and have the patient follow-up in 6 to 8 weeks.  He will need 30 minutes.

## 2025-01-16 NOTE — TELEPHONE ENCOUNTER
Spoke with Coral about medications   Advised to NOT give Seroquel to [facility], notified of rx sent to [facility]  Pt asked for prescription to be moved to [facility] closer to home   Called [facility] and had rx transferred to [facility] in [place]   Pharmacy list cleaned up

## 2025-03-03 ENCOUNTER — TELEPHONE (OUTPATIENT)
Dept: FAMILY MEDICINE CLINIC | Facility: CLINIC | Age: 85
End: 2025-03-03

## 2025-03-03 NOTE — TELEPHONE ENCOUNTER
Called pharmacy and confirmed he still has one refill left on each medication  Coral, wife, notified and she will call pharmacy to confirm when they will be ready

## 2025-03-03 NOTE — TELEPHONE ENCOUNTER
Patient calling for a refill on traZODone 50 MG Oral Tab and donepezil (ARICEPT) 5 MG Oral Tab to [facility]

## 2025-03-20 DIAGNOSIS — F02.C0 SEVERE ALZHEIMER'S DEMENTIA WITHOUT BEHAVIORAL DISTURBANCE, PSYCHOTIC DISTURBANCE, MOOD DISTURBANCE, OR ANXIETY, UNSPECIFIED TIMING OF DEMENTIA ONSET (HCC): ICD-10-CM

## 2025-03-20 DIAGNOSIS — G30.9 SEVERE ALZHEIMER'S DEMENTIA WITHOUT BEHAVIORAL DISTURBANCE, PSYCHOTIC DISTURBANCE, MOOD DISTURBANCE, OR ANXIETY, UNSPECIFIED TIMING OF DEMENTIA ONSET (HCC): ICD-10-CM

## 2025-03-20 RX ORDER — DONEPEZIL HYDROCHLORIDE 5 MG/1
5 TABLET, FILM COATED ORAL NIGHTLY
Qty: 90 TABLET | Refills: 0 | Status: SHIPPED | OUTPATIENT
Start: 2025-03-20

## 2025-03-20 NOTE — TELEPHONE ENCOUNTER
Pharmacy confirmed he has another refill for tramadol and they will work on filling that  He does not have any refills left of donepezil - Coral is requesting 90 day supply     LOV 11/26/24  NOV 4/29/25   Rx pended for approval - pended with #90 if ok

## 2025-03-20 NOTE — TELEPHONE ENCOUNTER
Wife asking for refills to [facility] of:    donepezil (ARICEPT) 5 MG Oral Tab     traZODone 50 MG Oral Tab     Asking for 90 day supply.

## 2025-04-02 ENCOUNTER — TELEPHONE (OUTPATIENT)
Dept: FAMILY MEDICINE CLINIC | Facility: CLINIC | Age: 85
End: 2025-04-02

## 2025-04-02 NOTE — TELEPHONE ENCOUNTER
Spouse Coral is requesting refill donepezil (ARICEPT) 5 MG Oral Tab -Pharmacy [facility] in [place], she is requesting if you can send a 90 day supply because of transportation problems. Pt has 1 pill left.

## 2025-04-02 NOTE — TELEPHONE ENCOUNTER
Refill was sent on 3/20/25   Spoke with pharmacy, they picked the refill up this afternoon at around 2:15 pm  Spoke with pt's wife Coral, she said her daughter must have picked it up but she has it now

## 2025-04-08 RX ORDER — POTASSIUM CHLORIDE 1.5 G/1.58G
POWDER, FOR SOLUTION ORAL
Qty: 90 EACH | Refills: 1 | Status: SHIPPED | OUTPATIENT
Start: 2025-04-08

## 2025-04-08 NOTE — TELEPHONE ENCOUNTER
Last office visit: 11/26/24   Protocol: pass    Requested medication(s) are due for refill today: Yes    Requested medication(s) are on the active medication list same strength, form, dose/ sig: Yes    Requested medication(s) are managed by provider: Yes    Patient has already received a courtsey refill: No    NOV: 04/29/2025  Asked to Return: 1/7/2025

## 2025-04-21 RX ORDER — TRAZODONE HYDROCHLORIDE 50 MG/1
50 TABLET ORAL NIGHTLY
Qty: 30 TABLET | Refills: 2 | Status: SHIPPED | OUTPATIENT
Start: 2025-04-21

## 2025-04-21 NOTE — TELEPHONE ENCOUNTER
Last office visit: 11/26/24   Protocol: pass  Requested medication(s) are due for refill today: yes  Requested medication(s) are on the active medication list same strength, form, dose/ sig: yes  Requested medication(s) are managed by provider: yes  Patient has already received a courtsey refill: no    NOV: 4/29/25    Asked to Return: 1/7/25

## 2025-04-29 ENCOUNTER — TELEPHONE (OUTPATIENT)
Dept: FAMILY MEDICINE CLINIC | Facility: CLINIC | Age: 85
End: 2025-04-29

## 2025-04-29 PROBLEM — E78.00 PURE HYPERCHOLESTEROLEMIA: Status: ACTIVE | Noted: 2025-04-29

## 2025-04-29 PROBLEM — I67.82 CEREBRAL ISCHEMIA: Status: ACTIVE | Noted: 2025-04-29

## 2025-04-29 PROBLEM — E03.9 ACQUIRED HYPOTHYROIDISM: Status: ACTIVE | Noted: 2025-04-29

## 2025-04-29 PROBLEM — F32.0 CURRENT MILD EPISODE OF MAJOR DEPRESSIVE DISORDER WITHOUT PRIOR EPISODE: Status: ACTIVE | Noted: 2025-04-29

## 2025-04-29 PROBLEM — E87.6 HYPOKALEMIA DUE TO LOSS OF POTASSIUM: Status: ACTIVE | Noted: 2025-04-29

## 2025-04-29 PROBLEM — E55.9 VITAMIN D DEFICIENCY: Status: ACTIVE | Noted: 2025-04-29

## 2025-04-29 PROCEDURE — 82607 VITAMIN B-12: CPT | Performed by: FAMILY MEDICINE

## 2025-04-29 PROCEDURE — 83036 HEMOGLOBIN GLYCOSYLATED A1C: CPT | Performed by: FAMILY MEDICINE

## 2025-04-29 PROCEDURE — 85025 COMPLETE CBC W/AUTO DIFF WBC: CPT | Performed by: FAMILY MEDICINE

## 2025-04-29 PROCEDURE — 80061 LIPID PANEL: CPT | Performed by: FAMILY MEDICINE

## 2025-04-29 PROCEDURE — 80053 COMPREHEN METABOLIC PANEL: CPT | Performed by: FAMILY MEDICINE

## 2025-04-29 PROCEDURE — 84439 ASSAY OF FREE THYROXINE: CPT | Performed by: FAMILY MEDICINE

## 2025-04-29 PROCEDURE — 84443 ASSAY THYROID STIM HORMONE: CPT | Performed by: FAMILY MEDICINE

## 2025-04-29 NOTE — TELEPHONE ENCOUNTER
Spoke with Coral, Dr KAUFMAN wanted to confirm dosing on his medications  Pt is taking amlodipine 10 mg daily   Trazodone 50 mg nightly, Coral was cutting it in half but now she is giving him the whole pill   Donepezil 5 mg nightly     Called and spoke with [facility], they will fax vaccine records to us   Shingrix 2/28/25, 12/5/24 (completed)  COVID 12/5/24  Will update immunization list when we receive from [facility]

## 2025-05-27 DIAGNOSIS — E87.6 HYPOKALEMIA DUE TO LOSS OF POTASSIUM: ICD-10-CM

## 2025-05-27 DIAGNOSIS — E88.810 METABOLIC SYNDROME: Primary | ICD-10-CM

## 2025-05-27 RX ORDER — POTASSIUM CHLORIDE 1500 MG/1
20 TABLET, EXTENDED RELEASE ORAL DAILY
Qty: 90 TABLET | Refills: 1 | Status: SHIPPED | OUTPATIENT
Start: 2025-05-27

## 2025-05-27 NOTE — TELEPHONE ENCOUNTER
Last office visit: 4/29/2025   Protocol: pass  Requested medication(s) are due for refill today: yes  Requested medication(s) are on the active medication list same strength, form, dose/ sig: yes  Requested medication(s) are managed by provider: yes  Patient has already received a courtsey refill: no    NOV: 7/22/2025  Last Labs: 4/29/2025  Asked to Return: 6/29/2025

## 2025-06-25 DIAGNOSIS — F02.C0 SEVERE ALZHEIMER'S DEMENTIA WITHOUT BEHAVIORAL DISTURBANCE, PSYCHOTIC DISTURBANCE, MOOD DISTURBANCE, OR ANXIETY, UNSPECIFIED TIMING OF DEMENTIA ONSET (HCC): ICD-10-CM

## 2025-06-25 DIAGNOSIS — G30.9 SEVERE ALZHEIMER'S DEMENTIA WITHOUT BEHAVIORAL DISTURBANCE, PSYCHOTIC DISTURBANCE, MOOD DISTURBANCE, OR ANXIETY, UNSPECIFIED TIMING OF DEMENTIA ONSET (HCC): ICD-10-CM

## 2025-06-25 RX ORDER — DONEPEZIL HYDROCHLORIDE 5 MG/1
5 TABLET, FILM COATED ORAL NIGHTLY
Qty: 90 TABLET | Refills: 0 | OUTPATIENT
Start: 2025-06-25

## 2025-06-25 RX ORDER — DONEPEZIL HYDROCHLORIDE 5 MG/1
5 TABLET, FILM COATED ORAL NIGHTLY
Qty: 30 TABLET | Refills: 0 | Status: SHIPPED | OUTPATIENT
Start: 2025-06-25

## 2025-06-25 NOTE — TELEPHONE ENCOUNTER
Last office visit: 4/29/25   Protocol: none  Requested medication(s) are due for refill today: yes  Requested medication(s) are on the active medication list same strength, form, dose/ sig: yes  Requested medication(s) are managed by provider: yes  Patient has already received a courtsey refill: no    NOV: 7/22/25    Asked to Return: 6/29/25

## 2025-07-02 ENCOUNTER — APPOINTMENT (OUTPATIENT)
Dept: CT IMAGING | Facility: HOSPITAL | Age: 85
End: 2025-07-02
Attending: EMERGENCY MEDICINE
Payer: MEDICARE

## 2025-07-02 ENCOUNTER — APPOINTMENT (OUTPATIENT)
Dept: CT IMAGING | Facility: HOSPITAL | Age: 85
End: 2025-07-02
Payer: MEDICARE

## 2025-07-02 ENCOUNTER — APPOINTMENT (OUTPATIENT)
Dept: GENERAL RADIOLOGY | Facility: HOSPITAL | Age: 85
End: 2025-07-02
Payer: MEDICARE

## 2025-07-02 ENCOUNTER — HOSPITAL ENCOUNTER (INPATIENT)
Facility: HOSPITAL | Age: 85
LOS: 4 days | Discharge: NURSING, REHAB OR LONG-TERM CARE | End: 2025-07-06
Attending: EMERGENCY MEDICINE | Admitting: INTERNAL MEDICINE
Payer: MEDICARE

## 2025-07-02 DIAGNOSIS — I62.03 CHRONIC SUBDURAL HEMATOMA (HCC): Primary | ICD-10-CM

## 2025-07-02 PROBLEM — F02.C0 SEVERE ALZHEIMER'S DEMENTIA (HCC): Status: ACTIVE | Noted: 2025-07-02

## 2025-07-02 PROBLEM — R47.01 APHASIA: Status: ACTIVE | Noted: 2025-07-02
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
## 2025-07-02 PROBLEM — S06.5XAA SUBACUTE SUBDURAL HEMATOMA (HCC): Status: ACTIVE | Noted: 2025-07-02
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
## 2025-07-02 PROBLEM — G30.9 SEVERE ALZHEIMER'S DEMENTIA (HCC): Status: ACTIVE | Noted: 2025-07-02

## 2025-07-02 LAB
ALBUMIN SERPL-MCNC: 4.5 G/DL (ref 3.2–4.8)
ALBUMIN/GLOB SERPL: 1.6 {RATIO} (ref 1–2)
ALP LIVER SERPL-CCNC: 76 U/L (ref 45–117)
ALT SERPL-CCNC: 18 U/L (ref 10–49)
ANION GAP SERPL CALC-SCNC: 12 MMOL/L (ref 0–18)
APTT PPP: 26.3 SECONDS (ref 23–36)
AST SERPL-CCNC: 35 U/L (ref ?–34)
BASOPHILS # BLD AUTO: 0 X10(3) UL (ref 0–0.2)
BASOPHILS NFR BLD AUTO: 0 %
BILIRUB SERPL-MCNC: 0.4 MG/DL (ref 0.2–1.1)
BILIRUB UR QL STRIP.AUTO: NEGATIVE
BUN BLD-MCNC: 25 MG/DL (ref 9–23)
CALCIUM BLD-MCNC: 9.2 MG/DL (ref 8.7–10.6)
CHLORIDE SERPL-SCNC: 106 MMOL/L (ref 98–112)
CLARITY UR REFRACT.AUTO: CLEAR
CO2 SERPL-SCNC: 22 MMOL/L (ref 21–32)
CREAT BLD-MCNC: 1.28 MG/DL (ref 0.7–1.3)
EGFRCR SERPLBLD CKD-EPI 2021: 55 ML/MIN/1.73M2 (ref 60–?)
EOSINOPHIL # BLD AUTO: 0.2 X10(3) UL (ref 0–0.7)
EOSINOPHIL NFR BLD AUTO: 1.6 %
ERYTHROCYTE [DISTWIDTH] IN BLOOD BY AUTOMATED COUNT: 14 %
GLOBULIN PLAS-MCNC: 2.8 G/DL (ref 2–3.5)
GLUCOSE BLD-MCNC: 119 MG/DL (ref 70–99)
GLUCOSE BLD-MCNC: 119 MG/DL (ref 70–99)
GLUCOSE BLD-MCNC: 122 MG/DL (ref 70–99)
GLUCOSE BLD-MCNC: 123 MG/DL (ref 70–99)
GLUCOSE BLD-MCNC: 131 MG/DL (ref 70–99)
GLUCOSE BLD-MCNC: 96 MG/DL (ref 70–99)
GLUCOSE UR STRIP.AUTO-MCNC: NORMAL MG/DL
HCT VFR BLD AUTO: 46 % (ref 39–53)
HGB BLD-MCNC: 17 G/DL (ref 13–17.5)
INR BLD: 0.98 (ref 0.8–1.2)
KETONES UR STRIP.AUTO-MCNC: NEGATIVE MG/DL
LEUKOCYTE ESTERASE UR QL STRIP.AUTO: NEGATIVE
LYMPHOCYTES # BLD AUTO: 1.55 X10(3) UL (ref 1–4)
LYMPHOCYTES NFR BLD AUTO: 12.4 %
MCH RBC QN AUTO: 34.7 PG (ref 26–34)
MCHC RBC AUTO-ENTMCNC: 37 G/DL (ref 31–37)
MCV RBC AUTO: 93.9 FL (ref 80–100)
MONOCYTES # BLD AUTO: 1.05 X10(3) UL (ref 0.1–1)
MONOCYTES NFR BLD AUTO: 8.4 %
MRSA DNA SPEC QL NAA+PROBE: NEGATIVE
NEUTROPHILS # BLD AUTO: 9.75 X10 (3) UL (ref 1.5–7.7)
NEUTROPHILS # BLD AUTO: 9.75 X10(3) UL (ref 1.5–7.7)
NEUTROPHILS NFR BLD AUTO: 77.6 %
NITRITE UR QL STRIP.AUTO: NEGATIVE
OSMOLALITY SERPL CALC.SUM OF ELEC: 296 MOSM/KG (ref 275–295)
PH UR STRIP.AUTO: 5.5 [PH] (ref 5–8)
PLATELET # BLD AUTO: 235 10(3)UL (ref 150–450)
POTASSIUM SERPL-SCNC: 4.4 MMOL/L (ref 3.5–5.1)
PROT SERPL-MCNC: 7.3 G/DL (ref 5.7–8.2)
PROT UR STRIP.AUTO-MCNC: 30 MG/DL
PROTHROMBIN TIME: 13.1 SECONDS (ref 11.6–14.8)
RBC # BLD AUTO: 4.9 X10(6)UL (ref 3.8–5.8)
RBC UR QL AUTO: NEGATIVE
SODIUM SERPL-SCNC: 140 MMOL/L (ref 136–145)
SP GR UR STRIP.AUTO: 1.02 (ref 1–1.03)
TROPONIN I SERPL HS-MCNC: 8 NG/L (ref ?–53)
UROBILINOGEN UR STRIP.AUTO-MCNC: NORMAL MG/DL
WBC # BLD AUTO: 12.6 X10(3) UL (ref 4–11)

## 2025-07-02 PROCEDURE — 99223 1ST HOSP IP/OBS HIGH 75: CPT | Performed by: INTERNAL MEDICINE

## 2025-07-02 PROCEDURE — 99291 CRITICAL CARE FIRST HOUR: CPT | Performed by: OTHER

## 2025-07-02 PROCEDURE — 99291 CRITICAL CARE FIRST HOUR: CPT | Performed by: NURSE PRACTITIONER

## 2025-07-02 PROCEDURE — 72125 CT NECK SPINE W/O DYE: CPT | Performed by: EMERGENCY MEDICINE

## 2025-07-02 PROCEDURE — 70450 CT HEAD/BRAIN W/O DYE: CPT | Performed by: EMERGENCY MEDICINE

## 2025-07-02 PROCEDURE — 70450 CT HEAD/BRAIN W/O DYE: CPT | Performed by: NURSE PRACTITIONER

## 2025-07-02 PROCEDURE — 71045 X-RAY EXAM CHEST 1 VIEW: CPT | Performed by: NURSE PRACTITIONER

## 2025-07-02 PROCEDURE — 99223 1ST HOSP IP/OBS HIGH 75: CPT | Performed by: NEUROLOGICAL SURGERY

## 2025-07-02 RX ORDER — PANTOPRAZOLE SODIUM 40 MG/1
40 TABLET, DELAYED RELEASE ORAL
Status: DISCONTINUED | OUTPATIENT
Start: 2025-07-02 | End: 2025-07-02 | Stop reason: SDUPTHER

## 2025-07-02 RX ORDER — ATORVASTATIN CALCIUM 10 MG/1
10 TABLET, FILM COATED ORAL NIGHTLY
Status: DISCONTINUED | OUTPATIENT
Start: 2025-07-02 | End: 2025-07-06

## 2025-07-02 RX ORDER — SODIUM CHLORIDE 9 MG/ML
INJECTION, SOLUTION INTRAVENOUS CONTINUOUS
Status: DISCONTINUED | OUTPATIENT
Start: 2025-07-02 | End: 2025-07-02

## 2025-07-02 RX ORDER — LORAZEPAM 2 MG/ML
INJECTION INTRAMUSCULAR
Status: COMPLETED
Start: 2025-07-02 | End: 2025-07-02

## 2025-07-02 RX ORDER — LEVOTHYROXINE SODIUM 50 UG/1
50 TABLET ORAL
Status: DISCONTINUED | OUTPATIENT
Start: 2025-07-02 | End: 2025-07-06

## 2025-07-02 RX ORDER — PANTOPRAZOLE SODIUM 40 MG/1
40 TABLET, DELAYED RELEASE ORAL
Status: DISCONTINUED | OUTPATIENT
Start: 2025-07-02 | End: 2025-07-06

## 2025-07-02 RX ORDER — DULOXETIN HYDROCHLORIDE 30 MG/1
30 CAPSULE, DELAYED RELEASE ORAL DAILY
Status: DISCONTINUED | OUTPATIENT
Start: 2025-07-02 | End: 2025-07-06

## 2025-07-02 RX ORDER — ALLOPURINOL 100 MG/1
100 TABLET ORAL DAILY
Status: DISCONTINUED | OUTPATIENT
Start: 2025-07-02 | End: 2025-07-06

## 2025-07-02 RX ORDER — MEMANTINE HYDROCHLORIDE 10 MG/1
10 TABLET ORAL 2 TIMES DAILY
Status: DISCONTINUED | OUTPATIENT
Start: 2025-07-02 | End: 2025-07-06

## 2025-07-02 RX ORDER — AMLODIPINE BESYLATE 5 MG/1
10 TABLET ORAL DAILY
Status: DISCONTINUED | OUTPATIENT
Start: 2025-07-02 | End: 2025-07-06

## 2025-07-02 RX ORDER — ACETAMINOPHEN 650 MG/1
650 SUPPOSITORY RECTAL EVERY 4 HOURS PRN
Status: DISCONTINUED | OUTPATIENT
Start: 2025-07-02 | End: 2025-07-06

## 2025-07-02 RX ORDER — ACETAMINOPHEN 325 MG/1
650 TABLET ORAL EVERY 4 HOURS PRN
Status: DISCONTINUED | OUTPATIENT
Start: 2025-07-02 | End: 2025-07-06

## 2025-07-02 RX ORDER — LABETALOL HYDROCHLORIDE 5 MG/ML
10 INJECTION, SOLUTION INTRAVENOUS EVERY 10 MIN PRN
Status: COMPLETED | OUTPATIENT
Start: 2025-07-02 | End: 2025-07-02

## 2025-07-02 RX ORDER — ONDANSETRON 2 MG/ML
4 INJECTION INTRAMUSCULAR; INTRAVENOUS EVERY 6 HOURS PRN
Status: DISCONTINUED | OUTPATIENT
Start: 2025-07-02 | End: 2025-07-06

## 2025-07-02 RX ORDER — METOCLOPRAMIDE HYDROCHLORIDE 5 MG/ML
5 INJECTION INTRAMUSCULAR; INTRAVENOUS EVERY 8 HOURS PRN
Status: DISCONTINUED | OUTPATIENT
Start: 2025-07-02 | End: 2025-07-05

## 2025-07-02 RX ORDER — HYDRALAZINE HYDROCHLORIDE 20 MG/ML
10 INJECTION INTRAMUSCULAR; INTRAVENOUS EVERY 2 HOUR PRN
Status: DISCONTINUED | OUTPATIENT
Start: 2025-07-02 | End: 2025-07-05

## 2025-07-02 RX ORDER — DONEPEZIL HYDROCHLORIDE 5 MG/1
5 TABLET, FILM COATED ORAL NIGHTLY
Status: DISCONTINUED | OUTPATIENT
Start: 2025-07-02 | End: 2025-07-02

## 2025-07-02 RX ORDER — CETIRIZINE HYDROCHLORIDE 5 MG/1
5 TABLET ORAL DAILY
Status: DISCONTINUED | OUTPATIENT
Start: 2025-07-02 | End: 2025-07-06

## 2025-07-02 RX ORDER — DONEPEZIL HYDROCHLORIDE 10 MG/1
10 TABLET, FILM COATED ORAL NIGHTLY
Status: DISCONTINUED | OUTPATIENT
Start: 2025-07-02 | End: 2025-07-06

## 2025-07-02 NOTE — CONSULTS
[facility] Neurosurgery Consult    Kendrick Valentin Patient Status:  Inpatient    1940 MRN [id number]   Location [facility] 6NE-A Attending Rocio Vigil MD   Hosp Day # 0 PCP Joann Lima DO     REASON FOR CONSULTATION:  Chronic L SDH    HISTORY OF PRESENT ILLNESS:  Kendrick Valentin is a(n) 84 year old male with PMH severe Alzheimer's dementia, HTN, HLD, depression, hypothyroidism, GERD and gout presenting after a fall. Patient unable to provide history. Per report, patient had an unwitnessed fall from bed, found down on the floor by his wife. He was taken to the ED and a head CT revealed chronic L SDH. Patient found to be agitated in ED for which he was given Ativan.   Per daughter patient at baseline states very few words. He uses a walker to ambulate and has difficulty with his balance with multiple recent falls. He sustained a laceration to his forehead from a fall about 4 months ago. He had a fall about 6 weeks ago where he sustained a black eye. He currently lives at home with his wife and has a caretaker during the day.     PAST MEDICAL HISTORY:  Past Medical History[1]    PAST SURGICAL HISTORY:  Past Surgical History[2]    FAMILY HISTORY:  family history includes Hypertension in his father and sister; Other in his mother.    SOCIAL HISTORY:   reports that he has never smoked. He has never used smokeless tobacco. He reports that he does not drink alcohol and does not use drugs.    ALLERGIES:  Allergies[3]    MEDICATIONS:  Prescriptions Prior to Admission[4]  Current Hospital Medications[5]    REVIEW OF SYSTEMS:  Comprehensive Review of Systems obtained, and is negative other than that mentioned in the History of Present Illness.      PHYSICAL EXAMINATION:  VITAL SIGNS: /89   Pulse 97   Temp 97.1 °F (36.2 °C) (Temporal)   Resp 20   Wt 188 lb 0.8 oz (85.3 kg)   SpO2 97%   BMI 29.45 kg/m²   GENERAL:  Patient is a 84 year old male in no acute distress.  HEENT:  Normocephalic,  atraumatic    NEUROLOGICAL:  This patient is resting in bed. Arouses to voice and falls back asleep. Does not answer verbal orientation questions, states \"what would you like me to do.\"   Prefers to keep eyes closed, slightly opens to command  Pupils 3mm and briskly reactive bilaterally  Does not follow commands  Moves all four extremities spontaneously with good strength    DIAGNOSTIC DATA:   Lab Results   Component Value Date    WBC 12.6 07/02/2025    HGB 17.0 07/02/2025    HCT 46.0 07/02/2025    .0 07/02/2025    CREATSERUM 1.28 07/02/2025    BUN 25 07/02/2025     07/02/2025    K 4.4 07/02/2025     07/02/2025    CO2 22.0 07/02/2025     07/02/2025    CA 9.2 07/02/2025    ALB 4.5 07/02/2025    ALKPHO 76 07/02/2025    BILT 0.4 07/02/2025    TP 7.3 07/02/2025    AST 35 07/02/2025    ALT 18 07/02/2025    PTT 26.3 07/02/2025    INR 0.98 07/02/2025    PTP 13.1 07/02/2025    PGLU 119 07/02/2025       IMAGING:  CT SPINE CERVICAL (CPT=72125)  Result Date: 7/2/2025  CONCLUSION: No acute fracture or subluxation of the cervical spine within the limitations of the exam as above. Degenerative changes as above. Please see above for further details. A preliminary report was provided by the [facility] service.  Electronically Verified and Signed by Attending Radiologist: Hiram Mendez MD 7/2/2025 7:52 AM Workstation: [id number]    CT BRAIN OR HEAD (CPT=70450)  Result Date: 7/2/2025  CONCLUSION: 1.  Mixed attenuation subdural hematoma along the left cerebral convexity with reference thickness of approximately 2.0 cm is likely at least in part subacute. Mild to moderate local mass effect. 7 mm of left to right midline shift. 2.  No evidence of acute territorial infarction. Stable moderate chronic microvascular ischemic changes in the cerebral white matter. If there is clinical concern for acute ischemia/infarction, an MRI of the brain would be recommended for further evaluation. A preliminary  report was provided by the [facility] service. Critical results were discussed with Dr. Ross by Dr. Caba at 3:20 a.m. at 7/2/2025. Critical results were read back. Electronically Verified and Signed by Attending Radiologist: Hiram Mendez MD 7/2/2025 5:19 AM Workstation: [id number]       ASSESSMENT:  Problem List[6]  Kendrick Valentin is a(n) 84 year old male with PMH severe Alzheimer's dementia, HTN, HLD, depression, hypothyroidism, GERD and gout presenting after a fall found to have 1.8cm left frontal cerebral convexity SDH stable on follow up head CT. Patient with limited examination due to severe Alzheimer's disease.    Plan:  -No acute neurosurgical intervention indicated at this time  -Normotension  -Hold anticoagulants/antiplatelets  -SCDs for DVT ppx  -Dr. Nina reviewed imaging with wife and daughter at bedside. He discussed at length with wife and daughter at beside surgical intervention including L MMA embolization and/or L crani for SDH evacuation. Wife reports patient would not want any intervention and is currently happy with how his life is. She notes with his advanced Alzheimer's he does not currently have much quality of life. She is interested in keeping him comfortable at that time      Patient seen with Dr. Maico Barros PA-C  [facility]  7/2/2025 10:16 AM      Is this a shared or split note between Advanced Practice Provider and Physician? Yes          [1]   Past Medical History:   Dementia (HCC)    Depression    Essential hypertension    Osteoarthritis   [2] History reviewed. No pertinent surgical history.  [3]   Allergies  Allergen Reactions    Bee Venom UNKNOWN    Dust Mite Extract UNKNOWN    Seasonal UNKNOWN   [4]   Medications Prior to Admission   Medication Sig Dispense Refill Last Dose/Taking    DONEPEZIL 5 MG Oral Tab TAKE 1 TABLET(5 MG) BY MOUTH EVERY NIGHT 30 tablet 0     POTASSIUM CHLORIDE 20 MEQ Oral Tab CR TAKE 1 TABLET(20 MEQ) BY  MOUTH DAILY 90 tablet 1     Multiple Vitamin (MULTIVITAMIN ADULT) Oral Tab as directed Orally       omeprazole 20 MG Oral Capsule Delayed Release Take 1 capsule (20 mg total) by mouth every morning. 90 capsule 0     levothyroxine 50 MCG Oral Tab Take 1 tablet (50 mcg total) by mouth before breakfast. 90 tablet 0     TRAZODONE 50 MG Oral Tab TAKE 1 TABLET(50 MG) BY MOUTH EVERY NIGHT 30 tablet 2     allopurinol 100 MG Oral Tab Take 1 tablet (100 mg total) by mouth daily.       DULoxetine 30 MG Oral Cap DR Particles Take 1 capsule (30 mg total) by mouth daily. 30 capsule 3     amLODIPine 10 MG Oral Tab Take 1 tablet (10 mg total) by mouth daily. 30 tablet 3     memantine 10 MG Oral Tab Take 1 tablet (10 mg total) by mouth 2 (two) times daily.       zolpidem 5 MG Oral Tab Take 1 tablet (5 mg total) by mouth nightly as needed. (Patient not taking: Reported on 4/29/2025)       cetirizine 10 MG Oral Tab Take 1 tablet (10 mg total) by mouth daily.       SIMVASTATIN 20 MG Oral Tab TAKE 1 TABLET BY MOUTH  DAILY 90 tablet 1     AMLODIPINE BESYLATE 5 MG Oral Tab TAKE 1 TABLET BY MOUTH  DAILY 90 tablet 1     TRIAMTERENE-HCTZ 37.5-25 MG Oral Cap TAKE 1 CAPSULE BY MOUTH  EVERY MORNING 90 capsule 1     Montelukast Sodium 10 MG Oral Tab Take 1 tablet (10 mg total) by mouth nightly. (Patient not taking: Reported on 4/29/2025) 90 tablet 3     LORazepam 1 MG Oral Tab Take 1 tablet (1 mg total) by mouth every 4 (four) hours as needed for Anxiety. (Patient not taking: Reported on 4/29/2025) 90 tablet 0    [5]   Current Facility-Administered Medications   Medication Dose Route Frequency    sodium chloride 0.9% infusion   Intravenous Continuous    acetaminophen (Tylenol) tab 650 mg  650 mg Oral Q4H PRN    Or    acetaminophen (Tylenol) rectal suppository 650 mg  650 mg Rectal Q4H PRN    labetalol (Trandate) 5 mg/mL injection 10 mg  10 mg Intravenous Q10 Min PRN    hydrALAzine (Apresoline) 20 mg/mL injection 10 mg  10 mg Intravenous Q2H PRN     ondansetron (Zofran) 4 MG/2ML injection 4 mg  4 mg Intravenous Q6H PRN    metoclopramide (Reglan) 5 mg/mL injection 5 mg  5 mg Intravenous Q8H PRN    niCARdipine in sodium chloride 0.86% (carDENE) 20 mg/200mL infusion premix  5-15 mg/hr Intravenous Continuous PRN    allopurinol (Zyloprim) tab 100 mg  100 mg Oral Daily    cetirizine (ZyrTEC) tab 5 mg  5 mg Oral Daily    donepezil (Aricept) tab 5 mg  5 mg Oral Nightly    DULoxetine (Cymbalta) DR cap 30 mg  30 mg Oral Daily    levothyroxine (Synthroid) tab 50 mcg  50 mcg Oral Before breakfast    memantine (Namenda) tab 10 mg  10 mg Oral BID    atorvastatin (Lipitor) tab 10 mg  10 mg Oral Nightly    pantoprazole (Protonix) DR tab 40 mg  40 mg Oral QAM AC    Or    pantoprazole (Protonix) 40 mg in sodium chloride 0.9% PF 10 mL IV push  40 mg Intravenous QAM AC   [6]   Patient Active Problem List  Diagnosis    GERD without esophagitis    Accelerated hypertension    Primary osteoarthritis involving multiple joints    Peripheral neuropathy    Anxiety    Allergic rhinitis    Hypertriglyceridemia    Metabolic syndrome    Acquired hypothyroidism    Cerebral ischemia    Current mild episode of major depressive disorder without prior episode    Hypokalemia due to loss of potassium    Pure hypercholesterolemia    Vitamin D deficiency    Chronic subdural hematoma (HCC)    Fall at home, initial encounter    Aphasia    Subacute subdural hematoma (HCC)

## 2025-07-02 NOTE — PLAN OF CARE
Patient received from ED. Started on Cardene gtt for BP management per neuro parameters.  Neuro assessment incomplete and unable to have patient cooperate.  Wife called and baseline status determined.  External catheter placed.  Patient moderately groggy with eyes closed most of the time.      Patient started waking up and got more restless.  Pulled out IV, and all his monitoring wires.  Was able to calm and place new IV.

## 2025-07-02 NOTE — ED INITIAL ASSESSMENT (HPI)
Pt brought to the ED via EMS from home for unwitnessed fall from bed. Wife heard thud, patient was on floor. PT a&o x2 per baseline. Hx of parkinsons. +ccollar.      Ccollar removed by MD Ross

## 2025-07-02 NOTE — OCCUPATIONAL THERAPY NOTE
OCCUPATIONAL THERAPY EVALUATION - INPATIENT     Room Number: 6618/6618-A  Evaluation Date: 7/2/2025  Type of Evaluation: Initial  Presenting Problem: Fell from bed, subacute to chronic subdural hematoma with mass effect and midline shift    Physician Order: IP Consult to Occupational Therapy  Reason for Therapy: ADL/IADL Dysfunction and Discharge Planning    OCCUPATIONAL THERAPY ASSESSMENT   Patient is currently functioning near baseline with toileting, lower body dressing, and transfers. Prior to admission, patient's baseline is per wife, caregiver assists with dressing showering, and toileting, min A level. Uses rollator.  Caregiver 12 hrs/day daily.  Patient is requiring moderate assistance as a result of the following impairments: decreased endurance and decreased insight to deficits. Occupational Therapy will continue to follow for duration of hospitalization.    Patient will benefit from continued skilled OT Services to promote return to prior level of function and safety with continuous assistance and gradual rehabilitative therapy    History Related to Current Admission: Patient is a 84 year old male admitted on 7/2/2025 with Presenting Problem: Fell from bed, subacute to chronic subdural hematoma with mass effect and midline shift. Co-Morbidities : severe Alzheimer's dementia, HTN, HLD, hypothyroidism, and GERD    EVALUATION SESSION:  Patient Start of Session: supine    FUNCTIONAL TRANSFER ASSESSMENT  Sit to Stand: Edge of Bed  Edge of Bed: Minimal Assist    BED MOBILITY  Supine to Sit : Moderate Assist  Sit to Supine (OT): Supervision    COGNITION  Initiation: hand over hand to initiate tasks  Safety Judgement:  decreased awareness of need for safety  Awareness of Deficits:  not aware of deficits  Problem Solving:  assistance required to generate solutions and assistance required to implement solutions    Upper Extremity   ROM: within functional limits   Strength: within functional limits     EDUCATION  PROVIDED  Patient Education : Role of Occupational Therapy; Plan of Care; Posture/Positioning  Patient's Response to Education: Requires Further Education    Equipment used: rw      Therapist comments: pt's wife and caregiver were present initially.  Mod A supine to sit. Min A to stand, cueing for safe rw and hand placement, mod A with RW for ambulation, refer to PT note about gait.  Supervision sit to supine. Not verbalizing words. Per wife, this is close to baseline.      Patient End of Session: In bed, Needs met, Call light within reach, RN aware of session/findings, All patient questions and concerns addressed, Alarm set    OCCUPATIONAL PROFILE    HOME SITUATION  Type of Home: House     Lives With: Spouse, Caregiver part-time (CG 12 hr/day)       Shower/Tub and Equipment: Walk-in shower             Drives: No  Patient Regularly Uses: Glasses, Rollator    Prior Level of Function: per wife, caregiver assists with dressing showering, and toileting, min A level. Uses rollator.  Caregiver 12 hrs/day daily    PAIN ASSESSMENT  Ratin          OBJECTIVE  Precautions: Bed/chair alarm (-150)  Fall Risk: High fall risk  AM-PAC ‘6-Clicks’ Inpatient Daily Activity Short Form  -   Putting on and taking off regular lower body clothing?: A Lot  -   Bathing (including washing, rinsing, drying)?: A Lot  -   Toileting, which includes using toilet, bedpan or urinal? : A Lot  -   Putting on and taking off regular upper body clothing?: A Little  -   Taking care of personal grooming such as brushing teeth?: A Little  -   Eating meals?: A Little    AM-PAC Score:  Score: 15  Approx Degree of Impairment: 56.46%  Standardized Score (AM-PAC Scale): 34.69      PLAN     OT Treatment Plan: Balance activities, Energy conservation/work simplification techniques, ADL training, Functional transfer training, UE strengthening/ROM, Patient/Family training, Compensatory technique education  Rehab Potential : Fair  Frequency: 3x/week  Number  of Visits to Meet Established Goals: 5    ADL Goals   Patient will perform upper body dressing:  with setup  Patient will perform lower body dressing:  with min assist  Patient will perform toileting: with min assist    Functional Transfer Goals  Patient will transfer to toilet:  cga    Patient Evaluation Complexity Level:   Occupational Profile/Medical History LOW - Brief history including review of medical or therapy records    Specific performance deficits impacting engagement in ADL/IADL LOW  1 - 3 performance deficits    Client Assessment/Performance Deficits MODERATE - Comorbidities and min to mod modifications of tasks    Clinical Decision Making LOW - Analysis of occupational profile, problem-focused assessments, limited treatment options    Overall Complexity LOW     OT Session Time: 20 minutes  Self-Care Home Management:  minutes  Therapeutic Activity: 10 minutes

## 2025-07-02 NOTE — PLAN OF CARE
Received patient awake not following commands few words \"bathroom\".  Moves all extremities equally, picking at name band and IV's.  Daughter came able to say her name. Walked to bathroom.  Passed video swallow.  PT/OT walked with 2 person wheelchair and gait belt.  Mats on the floor.  Bed alarm, Busy apron.   Wife and care giver at bedside.    Updated wife and daughter of transfer and new room.  Problem: NEUROLOGICAL - ADULT  Goal: Achieves stable or improved neurological status  Description: INTERVENTIONS  - Assess for and report changes in neurological status  - Initiate measures to prevent increased intracranial pressure  - Maintain blood pressure and fluid volume within ordered parameters to optimize cerebral perfusion and minimize risk of hemorrhage  - Monitor temperature, glucose, and sodium. Initiate appropriate interventions as ordered  Outcome: Progressing  Goal: Absence of seizures  Description: INTERVENTIONS  - Monitor for seizure activity  - Administer anti-seizure medications as ordered  - Monitor neurological status  Outcome: Progressing

## 2025-07-02 NOTE — SLP NOTE
ADULT SWALLOWING EVALUATION    ASSESSMENT    ASSESSMENT/OVERALL IMPRESSION:  Patient seen for swallowing evaluation per protocol. Patient admitted post unwitnessed fall from bed. Imaging revealed chronic L SDH. This morning, he is in bed, nonverbal and not following commands. He is repositioning himself  in bed.     Patient not following commands for formal oral mechanism exam but no focal deficits appreciated. Limited po trials presented. Patient with intact oral retrieval and containment with puree, thin liquid and pills with water(collaborated with RN). Oral prep and transit grossly functional. Laryngeal excursion judged to be of adequate strength and timeliness to palpation. There were no overt signs of aspiration. Despite cognitive impairment, he did understand accepting po when presented and was even able to hold a cup with straw and self present po.    Once cleared by physician, recommend patient advance to goal diet of regular consistency solids and thin liquids with 1:1 supervision/assistance with feeding and observation of aspiration precautions. SLP will continue to follow for ongoing dysphagia assessment. Discussed with RN.            RECOMMENDATIONS   Diet Recommendations - Solids: Regular  Diet Recommendations - Liquids: Thin Liquids                           Aspiration Precautions: 1:1 feeding, Upright position, Slow rate, Small bites, Small sips  Medication Administration Recommendations: One pill at a time  Treatment Plan/Recommendations: Dysphagia therapy, Aspiration precautions, Communication evaluation    HISTORY   MEDICAL HISTORY  Reason for Referral:  (SDH)    Problem List  Principal Problem:    Chronic subdural hematoma (HCC)  Active Problems:    Accelerated hypertension    Fall at home, initial encounter    Aphasia    Subacute subdural hematoma (HCC)      Past Medical History  Past Medical History[1]    Prior Living Situation: Home with spouse  Diet Prior to Admission: Regular, Thin  liquids  Precautions: Aspiration, Seizure    Patient/Family Goals: to get better    SWALLOWING HISTORY  Current Diet Consistency: NPO  Dysphagia History: none documented  Imaging Results:   CXR from 7/2/25 revealed:  CONCLUSION: There is cardiomegaly with mild pulmonary vascular congestion and increased interstitial markings of the lungs that is concerning for changes related to congestive failure with mild pulmonary edema. Clinical correlation recommended.     Electronically Verified and Signed by Attending Radiologist: Hiram Mendez MD 7/2/2025 9:55 AM   Workstation: [id number]     Brain CT from 7/2/25 revealed:  CONCLUSION:     Stable mixed attenuation subdural hematoma left cerebral convexity, with stable midline shift and mass effect. No interval new bleeding since the prior exam from earlier this morning.       Electronically Verified and Signed by Attending Radiologist: Ashwin Thomas MD 7/2/2025 9:16 AM   Workstation: [id number]   SUBJECTIVE       OBJECTIVE   ORAL MOTOR EXAMINATION  Dentition: Functional  Symmetry: Within Functional Limits  Strength: Within Functional Limits  Tone: Within Functional Limits  Range of Motion: Within Functional Limits  Rate of Motion: Within Functional Limits    Voice Quality:  (no phonation elicited)  Respiratory Status: Unlabored  Consistencies Trialed: Thin liquids, Puree (pills with RN assist)  Method of Presentation: Staff/Clinician assistance, Straw, Single sips, Consecutive swallows  Patient Positioned: Upright, Midline (in bed)    Oral Phase of Swallow: Within Functional Limits                      Pharyngeal Phase of Swallow: Within Functional Limits           (Please note: Silent aspiration cannot be evaluated clinically. Videofluoroscopic Swallow Study is required to rule-out silent aspiration.)    Esophageal Phase of Swallow: No complaints consistent with possible esophageal involvement              GOALS  Goal #1 The patient will tolerate regular consistency and  thin liquids without overt signs or symptoms of aspiration with 95 % accuracy over 1-2 session(s).  In Progress   Goal #2 The patient/family/caregiver will demonstrate understanding and implementation of aspiration precautions and swallow strategies independently over 1-2 session(s).    In Progress     FOLLOW UP  Treatment Plan/Recommendations: Dysphagia therapy, Aspiration precautions, Communication evaluation  Duration: 1 week  Follow Up Needed (Documentation Required): Yes  SLP Follow-up Date: 07/03/25    Thank you for your referral.   If you have any questions, please contact Zac Gusman MS [facility]SL[facility]  Spectra [phone]         [1]   Past Medical History:   Dementia (HCC)    Depression    Essential hypertension    Osteoarthritis

## 2025-07-02 NOTE — PHYSICAL THERAPY NOTE
PHYSICAL THERAPY EVALUATION - INPATIENT     Room Number: 6618/6618-A  Evaluation Date: 7/2/2025  Type of Evaluation: Initial  Physician Order: PT Eval and Treat    Presenting Problem: SDH  Co-Morbidities : severe Alzheimer's dementia, HTN, HLD, hypothyroidism, and GERD  Reason for Therapy: Mobility Dysfunction and Discharge Planning    PHYSICAL THERAPY ASSESSMENT   Patient is a 84 year old male admitted 7/2/2025 for fall.  Prior to admission, patient's baseline is ambulates with rollator and receives support from CG 12hr/day.  Patient is currently functioning below baseline with bed mobility, transfers, and gait.  Patient is requiring minimal assist as a result of the following impairments: decreased functional strength, impaired static and dynamic balance, impaired coordination, impaired motor planning, decreased muscular endurance, cognitive deficits (requires inc time and repetition to follow functional commands), and medical status.  Physical Therapy will continue to follow for duration of hospitalization.    Patient will benefit from continued skilled PT Services to promote return to prior level of function and safety with continuous assistance and gradual rehabilitative therapy .    PLAN DURING HOSPITALIZATION  Nursing Mobility Recommendation :  (1-2 assist RW)  PT Device Recommendation: Rolling walker, Gait belt  PT Treatment Plan: Bed mobility, Body mechanics, Coordination, Endurance, Energy conservation, Patient education, Family education, Gait training, Neuromuscular re-educate, Range of motion, Strengthening, Stoop training, Stair training, Transfer training, Balance training  Rehab Potential : Good  Frequency (Obs): 3-5x/week     CURRENT GOALS    Goal #1 Patient is able to demonstrate supine - sit EOB @ level: supervision     Goal #2 Patient is able to demonstrate transfers EOB to/from Chair/Wheelchair at assistance level: supervision     Goal #3 Patient is able to ambulate 150 feet with assist device:  walker - rolling at assistance level: supervision     Goal #4    Goal #5    Goal #6    Goal Comments: Goals established on 7/2/2025      PHYSICAL THERAPY MEDICAL/SOCIAL HISTORY  History related to current admission: Patient is a 84 year old male admitted on 7/2/2025: Presented after an unwitnessed fall dx large L subacute on chronic SDH with midline shift, CT C spine (-) acute    HOME SITUATION  Type of Home: House                        Lives With: Spouse, Caregiver part-time (CG 12 hr/day)    Drives: No   Patient Regularly Uses: Glasses, Rollator      Prior Level of Lee:   \"Baseline oriented to self and has minimal speech (worse past 2 weeks).  Has a caregiver for 12h/day and when up, uses a walker\" Hx multiple falls confirmed with family, CG Arvin assists with dressing, bathing.     SUBJECTIVE  Nonverbal - baseline per spouse    OBJECTIVE  Precautions: Bed/chair alarm (-150)  Fall Risk: High fall risk    WEIGHT BEARING RESTRICTION     PAIN ASSESSMENT  Rating: Unable to rate  Location: pt did not c.o pain, CPOT 0       COGNITION  Following Commands:  follows one step commands with increased time and follows one step commands with repetition    RANGE OF MOTION AND STRENGTH ASSESSMENT  Upper extremity ROM and strength are within functional limits     Lower extremity ROM is within functional limits     Lower extremity strength is within functional limits     BALANCE  Static Sitting: Poor +  Dynamic Sitting: Poor +  Static Standing: Poor +  Dynamic Standing: Poor +    ADDITIONAL TESTS  Additional Tests: Modified [place]              Modified [place]: 4                  ACTIVITY TOLERANCE                         O2 WALK       NEUROLOGICAL FINDINGS                        AM-PAC '6-Clicks' INPATIENT SHORT FORM - BASIC MOBILITY  How much difficulty does the patient currently have...  Patient Difficulty: Turning over in bed (including adjusting bedclothes, sheets and blankets)?: A Little   Patient Difficulty:  Sitting down on and standing up from a chair with arms (e.g., wheelchair, bedside commode, etc.): A Little   Patient Difficulty: Moving from lying on back to sitting on the side of the bed?: A Little   How much help from another person does the patient currently need...   Help from Another: Moving to and from a bed to a chair (including a wheelchair)?: A Little   Help from Another: Need to walk in hospital room?: A Little   Help from Another: Climbing 3-5 steps with a railing?: A Lot     AM-PAC Score:  Raw Score: 17   Approx Degree of Impairment: 50.57%   Standardized Score (AM-PAC Scale): 42.13   CMS Modifier (G-Code): CK    FUNCTIONAL ABILITY STATUS  Gait Assessment   Functional Mobility/Gait Assessment  Gait Assistance: Minimum assistance  Distance (ft): 50  Assistive Device: Rolling walker  Pattern: Shuffle    Skilled Therapy Provided     Bed Mobility:  Rolling: mod A   Supine to sit: mod A    Sit to supine: mod I w/ use of bed rail     Transfer Mobility:  Sit to stand: min A    Stand to sit: mod A - pt tends to sit prematurely requiring inc cuing to complete safe transfer  Gait = min A - assist with RW navigation as pt use to ease of rollator     Therapist's Comments: Discussed case with RN prior to session initiation. Pt agreeable to participation in therapy, following functional commands verbally and w/ gestures and inc time/repetition. Gait belt donned for out of bed mobility. Participated in gait training in the nick with assist for RW navigation. RN requested PT call dtr - called however line was busy.     RR elevated in 30s throughout session even at rest.       Exercise/Education Provided:  Body mechanics  Functional activity tolerated  Gait training  Transfer training    Patient End of Session: In bed, Call light within reach, Needs met, RN aware of session/findings, All patient questions and concerns addressed, Hospital anti-slip socks, Alarm set, Discussed recommendations with /social  worker      Patient Evaluation Complexity Level:  History Moderate - 1 or 2 personal factors and/or co-morbidities   Examination of body systems Moderate - addressing a total of 3 or more elements   Clinical Presentation  Moderate - Evolving   Clinical Decision Making Moderate Complexity       PT Session Time: 20 minutes  Gait Training: 10 minutes  Therapeutic Activity:  minutes  Neuromuscular Re-education:  minutes  Therapeutic Exercise:  minutes

## 2025-07-02 NOTE — CONSULTS
[facility]   NEUROCRITICAL CARE   CONSULT NOTE    Admission date: 7/2/2025  Reason for Consult: SDH  Chief Complaint:   Chief Complaint   Patient presents with    Trauma    Fall   ________________________________________________________________    History     History of Presenting Illness  84 year old male with PMH for severe Alzheimer's dementia, HTN, HLD, hypothyroidism, and GERD, not on blood thinners presented post unwitnessed fall at home and was found to have large left sided subacute on chronic subdural hematoma with midline shift to the right of 7mm. Hypertensive requiring Cardene drip.  Discussed with CHANELLE, family would want to focus on patients comfort so does not want to proceed with interventions at this time.     Past Medical History[1]  Past Surgical History[2]  Short Social Hx on File[3]  Family History[4]  Allergies Allergies[5]    Home Meds  Current Outpatient Medications   Medication Instructions    allopurinol (ZYLOPRIM) 100 mg, Daily    amLODIPine (NORVASC) 10 mg, Oral, Daily    AMLODIPINE BESYLATE 5 MG Oral Tab TAKE 1 TABLET BY MOUTH  DAILY    cetirizine (ZYRTEC) 10 mg, Daily    donepezil (ARICEPT) 5 mg, Oral, Nightly    DULoxetine (CYMBALTA) 30 mg, Oral, Daily    levothyroxine (SYNTHROID) 50 mcg, Oral, Before breakfast    LORazepam (ATIVAN) 1 mg, Oral, Every 4 hours PRN    memantine (NAMENDA) 10 mg, 2 times daily    montelukast (SINGULAIR) 10 mg, Oral, Nightly    Multiple Vitamin (MULTIVITAMIN ADULT) Oral Tab as directed Orally    omeprazole (PRILOSEC) 20 mg, Oral, Every morning    POTASSIUM CHLORIDE 20 MEQ Oral Tab CR 20 mEq, Oral, Daily    SIMVASTATIN 20 MG Oral Tab TAKE 1 TABLET BY MOUTH  DAILY    traZODone (DESYREL) 50 mg, Oral, Nightly    TRIAMTERENE-HCTZ 37.5-25 MG Oral Cap TAKE 1 CAPSULE BY MOUTH  EVERY MORNING    zolpidem 5 MG Oral Tab 1 tablet, Nightly PRN     Scheduled Meds:Scheduled Medications[6]  Continuous Infusions:Medication Infusions[7]  PRN Meds:PRN  Medications[8]    OBJECTIVE   VITAL SIGNS:   Temp:  [97 °F (36.1 °C)-97.1 °F (36.2 °C)] 97.1 °F (36.2 °C)  Pulse:  [] 93  Resp:  [17-32] 29  BP: (124-176)/() 142/94  SpO2:  [92 %-97 %] 94 %    INTAKE/OUTPUT:  No intake or output data in the 24 hours ending 07/02/25 0926    PHYSICAL EXAM:  GENERAL APPEARANCE: Patient resting comfortably in bed, NAD  HEENT: Normocephalic, atraumatic.   LUNGS: Normal respiratory rate and effort   HEART: Regular rate and rhythm, S1, S2   ABDOMEN: Soft. No tenderness, guarding, or rebound.     NEUROLOGIC:    Mental Status:  Drowsy, but responds with minimal verbal output, following simple commands.    Cranial nerves: PERRL, 2mm brisk.  BTT BL, tracking BL, Face appears symmetric   Motor: Atleast antigravity throughout    Sensation: responds to touch bilaterally  Cerebellar: unable to perform d/t dementia    LABORATORY DATA:  Last 24 hour labs were reviewed in detail.  Recent Labs   Lab 07/02/25 0054      K 4.4      CO2 22.0   *   BUN 25*   CREATSERUM 1.28     Recent Labs   Lab 07/02/25 0054   WBC 12.6*   HGB 17.0   .0     Recent Labs   Lab 07/02/25 0054   ALT 18   AST 35*     No results for input(s): \"MG\", \"PHOS\" in the last 168 hours.    Radiology:    CT SPINE CERVICAL (CPT=72125)  Result Date: 7/2/2025  CONCLUSION: No acute fracture or subluxation of the cervical spine within the limitations of the exam as above. Degenerative changes as above. Please see above for further details. A preliminary report was provided by the [facility] teleradiology service.  Electronically Verified and Signed by Attending Radiologist: Hiram Mendez MD 7/2/2025 7:52 AM Workstation: [id number]    CT BRAIN OR HEAD (CPT=70450)  Result Date: 7/2/2025  CONCLUSION: 1.  Mixed attenuation subdural hematoma along the left cerebral convexity with reference thickness of approximately 2.0 cm is likely at least in part subacute. Mild to moderate local mass effect. 7 mm of left to  right midline shift. 2.  No evidence of acute territorial infarction. Stable moderate chronic microvascular ischemic changes in the cerebral white matter. If there is clinical concern for acute ischemia/infarction, an MRI of the brain would be recommended for further evaluation. A preliminary report was provided by the [facility] service. Critical results were discussed with Dr. Ross by Dr. Caba at 3:20 a.m. at 7/2/2025. Critical results were read back. Electronically Verified and Signed by Attending Radiologist: Hiram Mendez MD 7/2/2025 5:19 AM Workstation: [id number]    ASSESSMENT/PLAN   84 year old male with PMH of severe Alzheimer's dementia, HTN, HLD, hypothyroidism, and GERD, not on blood thinners presented post fall and found to have large left sided subacute on chronic subdural hematoma with midline shift.  Hypertensive in ED and started on Nicardipine gtt.     Neurological:  Fall, unwitnessed  Subdural Hematoma, suspect traumatic in nature         - Initial CT with subacute on chronic left holohemispheric subdural hematoma w/ MLS to right of 5mm         - INR 0.98 and Plt 235         - Nsg following, appreciate recs          - Holding AED for now, will use if seizure suspected         - Repeat CT this AM showing stability         - PT/OT evals          - No plans for intervention after family discussion     Alzheimer's Dementia, severe - Home medications when able (Donepezil, Memantine)     Depression   - Home medications when able     Cardiovascular:  Accelerated HTN         - SBP goal as noted above          - Resume home medications         - Intervention: Requiring IV BP medications to keep off nicardipine drip      HLD - Continue home med when able to take PO (simvastatin 20mg PO daily)     Pulmonary: - Satting well on RA,  Encourage IS use     Renal:         - Monitor I&Os          - IVF while NPO     Gastrointestinal:  Nutrition:        - NPO pending speech eval        - Bowel regimen:  ordered prn     GERD  -  Continue homePPI      Infectious Disease:   Leukocytosis - Mild, Afebrile and no current s/s of infection    Heme/Onc - Hb goal > 7, continue to monitor daily      Endocrine:  - Accuchecks q6 while NPO            Hypothyroidism - Continue home synthroid when able to take PO     Checklist         - Lines: PIVs         - DVT Prophylaxis: SCDs          - Diet:NPO         - IVF:NS          - Electrolytes: Replete per protocol         - Code Status: Full     Dispo: CNICU pending neurosurgery clearance    36 minutes of critical care time (exclusive of billable procedures) was administered to manage and/or prevent neurologic instability. This involved direct patient intervention, complex decision making, and/or extensive discussions with the patient, family, and clinical staff.    Aguilar Bowen MD  Neurocritical Care  [facility]       [1]   Past Medical History:   Dementia (HCC)    Depression    Essential hypertension    Osteoarthritis   [2] History reviewed. No pertinent surgical history.  [3]   Social History  Socioeconomic History    Marital status:    Tobacco Use    Smoking status: Never    Smokeless tobacco: Never   Vaping Use    Vaping status: Never Used   Substance and Sexual Activity    Alcohol use: No    Drug use: No     Social Drivers of Health     Food Insecurity: No Food Insecurity (4/29/2025)    NCSS - Food Insecurity     Worried About Running Out of Food in the Last Year: No     Ran Out of Food in the Last Year: No   Transportation Needs: No Transportation Needs (4/29/2025)    NCSS - Transportation     Lack of Transportation: No   Housing Stability: Not At Risk (4/29/2025)    NCSS - Housing/Utilities     Has Housing: Yes     Worried About Losing Housing: No     Unable to Get Utilities: No   [4]   Family History  Problem Relation Age of Onset    Hypertension Father     Other (Other[other]) Mother         old age    Hypertension Sister    [5]    Allergies  Allergen Reactions    Bee Venom UNKNOWN    Dust Mite Extract UNKNOWN    Seasonal UNKNOWN   [6]    allopurinol  100 mg Oral Daily    cetirizine  5 mg Oral Daily    donepezil  5 mg Oral Nightly    DULoxetine  30 mg Oral Daily    levothyroxine  50 mcg Oral Before breakfast    memantine  10 mg Oral BID    atorvastatin  10 mg Oral Nightly    pantoprazole  40 mg Oral QAM AC    Or    pantoprazole  40 mg Intravenous QAM AC   [7]    sodium chloride 75 mL/hr at 07/02/25 0442    niCARdipine     [8]   acetaminophen **OR** acetaminophen    labetalol    hydrALAzine    ondansetron    metoclopramide    niCARdipine

## 2025-07-02 NOTE — ED PROVIDER NOTES
Patient Seen in: [facility] Emergency Department        History  Chief Complaint   Patient presents with    Trauma    Fall     Stated Complaint:     Subjective:   HPI            84-year-old male presents emergency department for fall.  Patient had an unwitnessed fall at home and was found on the ground EMS arrived on the scene helped him up patient was not complaining of any pain was brought in for further evaluation patient has no reports of pain at this time and has a history of advanced history of dementia no other reports per EMS      Objective:     Past Medical History:    Dementia (HCC)    Depression    Essential hypertension    Osteoarthritis              History reviewed. No pertinent surgical history.             Social History     Socioeconomic History    Marital status:    Tobacco Use    Smoking status: Never    Smokeless tobacco: Never   Vaping Use    Vaping status: Never Used   Substance and Sexual Activity    Alcohol use: No    Drug use: No     Social Drivers of Health     Food Insecurity: No Food Insecurity (4/29/2025)    NCSS - Food Insecurity     Worried About Running Out of Food in the Last Year: No     Ran Out of Food in the Last Year: No   Transportation Needs: No Transportation Needs (4/29/2025)    NCSS - Transportation     Lack of Transportation: No   Housing Stability: Not At Risk (4/29/2025)    NCSS - Housing/Utilities     Has Housing: Yes     Worried About Losing Housing: No     Unable to Get Utilities: No                                Physical Exam    ED Triage Vitals [07/02/25 0049]   BP (!) 173/98   Pulse 110   Resp 20   Temp    Temp src    SpO2 95 %   O2 Device None (Room air)       Current Vitals:   Vital Signs  BP: 150/88  Pulse: 102  Resp: 18  MAP (mmHg): (!) 117    Oxygen Therapy  SpO2: 95 %  O2 Device: None (Room air)            Physical Exam  Awake alert patient will not answer questions but does follow some commands HEENT exam normal lungs normal cardiovascular exam  normal abdomen normal moving all extremities without difficulty no focal deficits        ED Course  Labs Reviewed   COMP METABOLIC PANEL (14) - Abnormal; Notable for the following components:       Result Value    Glucose 131 (*)     BUN 25 (*)     Calculated Osmolality 296 (*)     eGFR-Cr 55 (*)     AST 35 (*)     All other components within normal limits   URINALYSIS WITH CULTURE REFLEX - Abnormal; Notable for the following components:    Protein Urine 30 (*)     Bacteria Urine Rare (*)     All other components within normal limits   CBC WITH DIFFERENTIAL WITH PLATELET - Abnormal; Notable for the following components:    WBC 12.6 (*)     MCH 34.7 (*)     Neutrophil Absolute Prelim 9.75 (*)     Neutrophil Absolute 9.75 (*)     Monocyte Absolute 1.05 (*)     All other components within normal limits   POCT GLUCOSE - Abnormal; Notable for the following components:    POC Glucose 119 (*)     All other components within normal limits   TROPONIN I HIGH SENSITIVITY - Normal   RAINBOW DRAW LAVENDER   RAINBOW DRAW LIGHT GREEN   RAINBOW DRAW BLUE     EKG    Rate, intervals and axes as noted on EKG Report.  Rate: 108  Rhythm: Sinus Rhythm  Reading: No areas of acute ST segment elevation or depression           ED Course as of 07/02/25 0242  ------------------------------------------------------------  Time: 07/02 0148  Value: Comp Metabolic Panel (14)(!)  Comment: Unremarkable  ------------------------------------------------------------  Time: 07/02 0148  Value: CBC With Differential With Platelet(!)  Comment: Slightly elevated white cell count  ------------------------------------------------------------  Time: 07/02 0148  Value: Troponin I (High Sensitivity)  Comment: Unremarkable  ------------------------------------------------------------  Time: 07/02 0242  Value: Urinalysis with Culture Reflex(!)  Comment: Unremarkable     Differential diagnosis includes subarachnoid hemorrhage C-spine fracture        A total of 35  minutes of critical care time (exclusive of billable procedures) was administered to manage the patient's critical imaging findings due to his subdural with midline shift shift.  This involved direct patient intervention, complex decision making, and/or extensive discussions with the patient, family, and clinical staff.            MDM             Medical Decision Making  84-year-old male presenting to the emergency department for unwitnessed fall IV established cardiac monitor shows a sinus rhythm pulse ox shows no signs of hypoxia laboratory results within acceptable limits independent interpretation by ED physician CT cervical spine shows no acute fractures.  Independent interpretation by ED physician CT scan brain shows a subacute to chronic subdural hematoma with mass effect and midline shift.  Patient will be admitted at this time    Amount and/or Complexity of Data Reviewed  Labs: ordered. Decision-making details documented in ED Course.  Radiology: ordered and independent interpretation performed. Decision-making details documented in ED Course.  ECG/medicine tests: ordered and independent interpretation performed. Decision-making details documented in ED Course.        Disposition and Plan     Clinical Impression:  1. Chronic subdural hematoma (HCC)         Disposition:  There is no disposition on file for this visit.  There is no disposition time on file for this visit.    Follow-up:  No follow-up provider specified.        Medications Prescribed:  Current Discharge Medication List                Supplementary Documentation:

## 2025-07-02 NOTE — H&P
[facility]     Kendrick Valentin Patient Status:  Emergency    1940 MRN [id number]   Location [facility] EMERGENCY DEPARTMENT Attending Sharad Ross MD   Hosp Day # 0 PCP Joann Lima DO     Chief Complaint: Fall    Subjective:    History of Present Illness:     Kendrick Valentin is a 84 year old male with history of severe alzheimer's dementia, HTN, HLD, depression, hypothyroidism, GERD, gout presented after a fall.     Patient suffered an unwitnessed fall from bed. Spouse heard thud and found the patient on the floor. EMS were called.     Patient tachycardic to 100s and hypertensive. EKG with sinus tachycardia. CT brain with subacute to chronic subdural hematoma with mass effect and midline shift. Neurosurgery consulted. Patient was placed on nicardipine drip. Patient was agitated in the ED requiring ativan.     During my assessment, patient lethargic.     History/Other:    Past Medical History:  Past Medical History:    Dementia (HCC)    Depression    Essential hypertension    Osteoarthritis     Past Surgical History:   Past Surgical History[1]   Family History:   Family History[2]  Social History:    reports that he has never smoked. He has never used smokeless tobacco. He reports that he does not drink alcohol and does not use drugs.     Allergies: Allergies[3]    Medications:  Medications Ordered Prior to Encounter[4]    Review of Systems:   A comprehensive review of systems was completed.    Pertinent positives and negatives noted in the HPI.    Objective:   Physical Exam:    BP (!) 131/104   Pulse 101   Temp 97 °F (36.1 °C) (Temporal)   Resp 18   Wt 188 lb 0.8 oz (85.3 kg)   SpO2 93%   BMI 29.45 kg/m²   General: No acute distress  Respiratory: No rhonchi, no wheezes  Cardiovascular: S1, S2. Regular rate and rhythm  Abdomen: Soft, Non-tender, non-distended, positive bowel sounds  Neuro: Lethargic. Moving all 4 extremities. Unable to keep eyes open. Tracks.    Extremities: No edema    Results:    Labs:      Labs Last 24 Hours:    Recent Labs   Lab 07/02/25 0054   RBC 4.90   HGB 17.0   HCT 46.0   MCV 93.9   MCH 34.7*   MCHC 37.0   RDW 14.0   NEPRELIM 9.75*   WBC 12.6*   .0       Recent Labs   Lab 07/02/25 0054   *   BUN 25*   CREATSERUM 1.28   EGFRCR 55*   CA 9.2   ALB 4.5      K 4.4      CO2 22.0   ALKPHO 76   AST 35*   ALT 18   BILT 0.4   TP 7.3       Estimated Glomerular Filtration Rate: 55 mL/min/1.73m2 (A) (result from lab).    Lab Results   Component Value Date    INR 0.98 07/02/2025       Recent Labs   Lab 07/02/25 0054   TROPHS 8       No results for input(s): \"TROP\", \"PBNP\" in the last 168 hours.    No results for input(s): \"PCT\" in the last 168 hours.    Imaging: Imaging data reviewed in Epic.    Assessment & Plan:      #Unwitnessed fall   #Subacute to chronic subdural hematoma with mass effect and midline shift  -Coags wnl  -Neurosurgery consult  -PT, OT  -Repeat CT brain   -Nicardipine drip     #Altered mental status   2/2 above. Ativan probably contributing  -b12, folic acid. Monitor    #SIRS  #Leukocytosis - possibly reactive. UA negative. Trend    #Hypertension     #Severe alzheimer's dementia  #HLD  #Depression  #Hypothyroidism  #GERD  #Gout    MED REC PENDING    Plan of care discussed with patient    Rocio Vigil MD    Supplementary Documentation:     The 21st Century Cures Act makes medical notes like these available to patients in the interest of transparency. Please be advised this is a medical document. Medical documents are intended to carry relevant information, facts as evident, and the clinical opinion of the practitioner. The medical note is intended as peer to peer communication and may appear blunt or direct. It is written in medical language and may contain abbreviations or verbiage that are unfamiliar.                                       [1] History reviewed. No pertinent surgical history.  [2]   Family  History  Problem Relation Age of Onset    Hypertension Father     Other (Other[other]) Mother         old age    Hypertension Sister    [3]   Allergies  Allergen Reactions    Bee Venom UNKNOWN    Dust Mite Extract UNKNOWN    Seasonal UNKNOWN   [4]   No current facility-administered medications on file prior to encounter.     Current Outpatient Medications on File Prior to Encounter   Medication Sig Dispense Refill    DONEPEZIL 5 MG Oral Tab TAKE 1 TABLET(5 MG) BY MOUTH EVERY NIGHT 30 tablet 0    POTASSIUM CHLORIDE 20 MEQ Oral Tab CR TAKE 1 TABLET(20 MEQ) BY MOUTH DAILY 90 tablet 1    Multiple Vitamin (MULTIVITAMIN ADULT) Oral Tab as directed Orally      omeprazole 20 MG Oral Capsule Delayed Release Take 1 capsule (20 mg total) by mouth every morning. 90 capsule 0    levothyroxine 50 MCG Oral Tab Take 1 tablet (50 mcg total) by mouth before breakfast. 90 tablet 0    TRAZODONE 50 MG Oral Tab TAKE 1 TABLET(50 MG) BY MOUTH EVERY NIGHT 30 tablet 2    allopurinol 100 MG Oral Tab Take 1 tablet (100 mg total) by mouth daily.      DULoxetine 30 MG Oral Cap DR Particles Take 1 capsule (30 mg total) by mouth daily. 30 capsule 3    amLODIPine 10 MG Oral Tab Take 1 tablet (10 mg total) by mouth daily. 30 tablet 3    memantine 10 MG Oral Tab Take 1 tablet (10 mg total) by mouth 2 (two) times daily.      zolpidem 5 MG Oral Tab Take 1 tablet (5 mg total) by mouth nightly as needed. (Patient not taking: Reported on 4/29/2025)      cetirizine 10 MG Oral Tab Take 1 tablet (10 mg total) by mouth daily.      SIMVASTATIN 20 MG Oral Tab TAKE 1 TABLET BY MOUTH  DAILY 90 tablet 1    AMLODIPINE BESYLATE 5 MG Oral Tab TAKE 1 TABLET BY MOUTH  DAILY 90 tablet 1    TRIAMTERENE-HCTZ 37.5-25 MG Oral Cap TAKE 1 CAPSULE BY MOUTH  EVERY MORNING 90 capsule 1    Montelukast Sodium 10 MG Oral Tab Take 1 tablet (10 mg total) by mouth nightly. (Patient not taking: Reported on 4/29/2025) 90 tablet 3    LORazepam 1 MG Oral Tab Take 1 tablet (1 mg total) by  mouth every 4 (four) hours as needed for Anxiety. (Patient not taking: Reported on 4/29/2025) 90 tablet 0

## 2025-07-02 NOTE — CM/SW NOTE
07/02/25 1400   CM/SW Referral Data   Referral Source Physician   Reason for Referral Discharge planning   Informant Daughter   Patient Info   Patient's Current Mental Status at Time of Assessment Confused or unable to complete assessment   Patient lives with Spouse/Significant other   Patient Status Prior to Admission   Independent with ADLs and Mobility No   Discharge Needs   Anticipated D/C needs Long term care facility;Subacute rehab   Services Requested   Submitted to [facility] Yes   PASRR Level 1 Submitted Yes   Choice of Post-Acute Provider   Informed patient of right to choose their preferred provider Yes     Order received for HH eval.  Notified by RN that daughter is at the bedside and wanted to discuss Medicaid application.    Pt is a 84 year old male with hx severe dementia who admitted after an unwitnessed fall and w/SDH.  Neurosurgery consulted and no procedures planned.     Met w/daughter Mary and the bedside for eval and planning.  She reports pt requires 24 hour supervision at home for safety and total assist with ADLs.  They have a caregiver daily for 12 hours and wife manages him at night.  Daughter states they are hoping to get him placed for LTC at [facility] but they won't accept with Medicaid pending.  They started application but pt/wife still have assets and not yet eligible   Noted [facility] currently not accepting LTC residents and have a wait list.  Explained to daughter that if rehab is appropriate, pt can qualify for a short stay at [facility] or another [facility] under his Medicare.  After that, pt would need to pay respite rates until Medicaid is active.  During discussion with daughter, pt repeatedly trying to get out of bed and pt with hx falls.  Explained that SNF would not be available for 24 hour supervision and fall prevention and she verbalized understanding.  Cost of 12 hr/day caregiver is high and if increased hours, she reports they would quickly go through all of their  savings.    PT worked w/pt and Anticipated therapy need: Gradual Rehabilitative Therapy    Referral sent in Aidin    / to remain available for support and/or discharge planning.     Iva RAMOSA MSN, RN Case Manager  [phone]

## 2025-07-02 NOTE — PROGRESS NOTES
[facility]   NEUROCRITICAL CARE   APRN PROGRESS NOTE    Admission date: 2025  Reason for Consult: SDH  Chief Complaint:   Chief Complaint   Patient presents with    Trauma    Fall       History     NAME: Kendrick Valentin - ROOM: 6618/6618-A - MRN: [id number] - Age: 84 year old - :1940    History Of Present Illness:  Kendrick Valentin is a 84 year old male with PMHx significant for severe Alzheimer's dementia, HTN, HLD, hypothyroidism, and GERD, not on blood thinners presented post unwitnessed fall at home and was found to have large left sided subacute on chronic subdural hematoma with midline shift to the right of 7mm. CT cspine without acute process.  Hypertensive in ED and started on Nicardipine gtt.  Neuro Critical Care and Neurosurgery were consulted in ED and patient has now been admitted to the Neuro ICU for close monitoring.    Patient unable to provide history given dementia and Ativan dose for agitation w/in 1hr of arrival to CNICU.  History obtained from chart review, wife and staff report.  Per ED documentation--wife heard a thud and found patient on the floor.  Per wife--Baseline oriented to self and has minimal speech (worse past 2 weeks).  Has a caregiver for 12h/day and when up, uses a walker.  Had multiple earlier this year.      Past Medical History:  Past Medical History[1]  Past Surgical History:   Past Surgical History[2]   Family History:   Family History[3]  Social History:    reports that he has never smoked. He has never used smokeless tobacco. He reports that he does not drink alcohol and does not use drugs.     Review of Systems:   A comprehensive 10 point review of systems was completed as able.  Pertinent positives and negatives noted in the HPI.    Current Hospital Medications[4]  OBJECTIVE   VITAL SIGNS:   Temp:  [97 °F (36.1 °C)] 97 °F (36.1 °C)  Pulse:  [] 100  Resp:  [17-30] 29  BP: (124-176)/() 124/76  SpO2:  [92 %-96 %] 95  %    INTAKE/OUTPUT:  No intake or output data in the 24 hours ending 07/02/25 0433    PHYSICAL EXAM:  GENERAL APPEARANCE: Patient resting comfortably in bed, NAD  HEENT: Normocephalic, atraumatic.   LUNGS: Normal respiratory rate and effort   HEART: Regular rate and rhythm, S1, S2   ABDOMEN: Soft. No tenderness, guarding, or rebound.   EXTREMITIES: No cyanosis, clubbing, or edema.  SKIN: Warm, dry, and well perfused, cap refill <3 secs  PSYCH: Mood stable, affect normal      NEUROLOGIC:    Mental Status:  Drowsy, regards and attempts to open eyes to name. Follows simple commands for hand squeeze and foot movement, no obvious aphasia or dysarthria with few words spoken  Cranial nerves: PERRL, 2mm brisk.  Visual fields appear full.  EOMI.  Face appears symmetric with normal movement bilaterally.  Tongue midline with normal movements.   Motor: Not following directions for drift testing, moves all extremities spontaneously and appears equal in strength.  Hand grasp and foot/leg movement 4/5  Sensation: responds to touch bilaterally  Cerebellar: unable to perform d/t AMS    GCS:   Eyes:3  Verbal:2--minimal words but clear  Motor: 5    LABORATORY DATA:  Last 24 hour labs were reviewed in detail.  Recent Labs   Lab 07/02/25  0054      K 4.4      CO2 22.0   *   BUN 25*   CREATSERUM 1.28     Recent Labs   Lab 07/02/25  0054   WBC 12.6*   HGB 17.0   .0     Recent Labs   Lab 07/02/25  0054   ALT 18   AST 35*     No results for input(s): \"MG\", \"PHOS\" in the last 168 hours.    Radiology:    No results found.  ASSESSMENT/PLAN   84 year old male with PMH of severe Alzheimer's dementia, HTN, HLD, hypothyroidism, and GERD, not on blood thinners presented post fall and found to have large left sided subacute on chronic subdural hematoma with midline shift.  Hypertensive in ED and started on Nicardipine gtt.    Neurological:  Fall, unwitnessed  Subdural Hematoma, suspect traumatic in nature   - Initial CT  with subacute on chronic left holohemispheric subdural hematoma w/ MLS to right of 5mm   - INR 0.98 and Plt 235   - Hold all antiplatelet and anticoagulant agents   - Nsg following, appreciate recs    - Holding AED for now, will use if seizure suspected   - EEG only if seizure activity   - Repeat CT in AM      Alzheimer's Dementia, severe   - Home medications when able (Donepezil, Memantine)    Depression   - Home medications when able    Cardiovascular:  Accelerated HTN   - SBP goal as noted above    - Hold home medications   - IV labetalol/hydralazine pushes and Nicardipine gtt prn     HLD - Continue home med when able to take PO (simvastatin 20mg PO daily)    Pulmonary:        - Admission CXR pending          - Satting well on RA   - Encourage IS     Renal:         - Monitor I&Os          - IVF while NPO    Gastrointestinal:  Nutrition:        - NPO for tonight        - Bowel regimen: ordered prn    GERD        - PPI as at home when able    Infectious Disease:    - Mild leukocytosis, follow CBC   - Afebrile and no current s/s of infection   - Continue to monitor     Heme/Onc - Hb goal > 7, continue to monitor daily     Endocrine:  Hyperglycemia, not diabetic   - HbA1c 4.7 on 4/29/25   - Accuchecks q6 for now     Hypothyroidism - Continue home synthroid when able to take PO    Checklist   - Lines: PIVs   - DVT Prophylaxis: SCDs    - Diet:NPO   - IVF:ND @ 75ml/h   - Electrolytes: Replete per protocol   - Code Status: Full for now per wife--clarify when family and caregiver arrives    Dispo: CNICU    Plan of care discussed with Neuro-Critical Care Intensivist.    SHAKEEL Marrero  Critical Care NP  [facility]  Spect: [phone]  Pgr: [phone]    Upon my evaluation, this patient had a high probability of imminent or life-threatening deterioration due to hypertension emergency and subdural hematoma, which required my direct attention, intervention, and personal management.    I have personally provided 35 minutes of  critical care time, exclusive of time spent on separately billable procedures.  Time includes review of all pertinent laboratory/radiology results, discussion with consultants, and monitoring for potential decompensation.  Performed interventions included vasoactive medications and complex neuro evaluation.         [1]   Past Medical History:   Dementia (HCC)    Depression    Essential hypertension    Osteoarthritis   [2] History reviewed. No pertinent surgical history.  [3]   Family History  Problem Relation Age of Onset    Hypertension Father     Other (Other[other]) Mother         old age    Hypertension Sister    [4]   Current Facility-Administered Medications   Medication Dose Route Frequency    niCARdipine in sodium chloride 0.86% (carDENE) 20 mg/200mL infusion premix  5-15 mg/hr Intravenous Continuous    sodium chloride 0.9% infusion   Intravenous Continuous    acetaminophen (Tylenol) tab 650 mg  650 mg Oral Q4H PRN    Or    acetaminophen (Tylenol) rectal suppository 650 mg  650 mg Rectal Q4H PRN    labetalol (Trandate) 5 mg/mL injection 10 mg  10 mg Intravenous Q10 Min PRN    hydrALAzine (Apresoline) 20 mg/mL injection 10 mg  10 mg Intravenous Q2H PRN    ondansetron (Zofran) 4 MG/2ML injection 4 mg  4 mg Intravenous Q6H PRN    metoclopramide (Reglan) 5 mg/mL injection 5 mg  5 mg Intravenous Q8H PRN    niCARdipine in sodium chloride 0.86% (carDENE) 20 mg/200mL infusion premix  5-15 mg/hr Intravenous Continuous PRN

## 2025-07-03 LAB
ALBUMIN SERPL-MCNC: 4.1 G/DL (ref 3.2–4.8)
ALBUMIN/GLOB SERPL: 1.5 {RATIO} (ref 1–2)
ALP LIVER SERPL-CCNC: 78 U/L (ref 45–117)
ALT SERPL-CCNC: 13 U/L (ref 10–49)
ANION GAP SERPL CALC-SCNC: 13 MMOL/L (ref 0–18)
AST SERPL-CCNC: 19 U/L (ref ?–34)
ATRIAL RATE: 108 BPM
BASOPHILS # BLD AUTO: 0.04 X10(3) UL (ref 0–0.2)
BASOPHILS NFR BLD AUTO: 0.4 %
BILIRUB SERPL-MCNC: 1 MG/DL (ref 0.2–1.1)
BUN BLD-MCNC: 15 MG/DL (ref 9–23)
CALCIUM BLD-MCNC: 9.4 MG/DL (ref 8.7–10.6)
CHLORIDE SERPL-SCNC: 104 MMOL/L (ref 98–112)
CO2 SERPL-SCNC: 22 MMOL/L (ref 21–32)
CREAT BLD-MCNC: 1.12 MG/DL (ref 0.7–1.3)
EGFRCR SERPLBLD CKD-EPI 2021: 65 ML/MIN/1.73M2 (ref 60–?)
EOSINOPHIL # BLD AUTO: 0.18 X10(3) UL (ref 0–0.7)
EOSINOPHIL NFR BLD AUTO: 1.7 %
ERYTHROCYTE [DISTWIDTH] IN BLOOD BY AUTOMATED COUNT: 14.8 %
FOLATE SERPL-MCNC: 33.7 NG/ML (ref 5.4–?)
GLOBULIN PLAS-MCNC: 2.8 G/DL (ref 2–3.5)
GLUCOSE BLD-MCNC: 106 MG/DL (ref 70–99)
GLUCOSE BLD-MCNC: 117 MG/DL (ref 70–99)
GLUCOSE BLD-MCNC: 131 MG/DL (ref 70–99)
GLUCOSE BLD-MCNC: 82 MG/DL (ref 70–99)
HCT VFR BLD AUTO: 45.1 % (ref 39–53)
HGB BLD-MCNC: 17 G/DL (ref 13–17.5)
IMM GRANULOCYTES # BLD AUTO: 0.04 X10(3) UL (ref 0–1)
IMM GRANULOCYTES NFR BLD: 0.4 %
LYMPHOCYTES # BLD AUTO: 1.6 X10(3) UL (ref 1–4)
LYMPHOCYTES NFR BLD AUTO: 14.7 %
MAGNESIUM SERPL-MCNC: 1.8 MG/DL (ref 1.6–2.6)
MCH RBC QN AUTO: 35.3 PG (ref 26–34)
MCHC RBC AUTO-ENTMCNC: 37.7 G/DL (ref 31–37)
MCV RBC AUTO: 93.8 FL (ref 80–100)
MONOCYTES # BLD AUTO: 0.84 X10(3) UL (ref 0.1–1)
MONOCYTES NFR BLD AUTO: 7.7 %
NEUTROPHILS # BLD AUTO: 8.19 X10 (3) UL (ref 1.5–7.7)
NEUTROPHILS # BLD AUTO: 8.19 X10(3) UL (ref 1.5–7.7)
NEUTROPHILS NFR BLD AUTO: 75.1 %
OSMOLALITY SERPL CALC.SUM OF ELEC: 291 MOSM/KG (ref 275–295)
P AXIS: 34 DEGREES
P-R INTERVAL: 196 MS
PLATELET # BLD AUTO: 264 10(3)UL (ref 150–450)
PLATELETS.RETICULATED NFR BLD AUTO: 0.9 % (ref 0–7)
POTASSIUM SERPL-SCNC: 4 MMOL/L (ref 3.5–5.1)
PROT SERPL-MCNC: 6.9 G/DL (ref 5.7–8.2)
Q-T INTERVAL: 348 MS
QRS DURATION: 84 MS
QTC CALCULATION (BEZET): 466 MS
R AXIS: -33 DEGREES
RBC # BLD AUTO: 4.81 X10(6)UL (ref 3.8–5.8)
SODIUM SERPL-SCNC: 139 MMOL/L (ref 136–145)
T AXIS: 6 DEGREES
TSI SER-ACNC: 3.83 UIU/ML (ref 0.55–4.78)
VENTRICULAR RATE: 108 BPM
VIT B12 SERPL-MCNC: 442 PG/ML (ref 211–911)
WBC # BLD AUTO: 10.9 X10(3) UL (ref 4–11)

## 2025-07-03 PROCEDURE — 99223 1ST HOSP IP/OBS HIGH 75: CPT | Performed by: OTHER

## 2025-07-03 PROCEDURE — 99232 SBSQ HOSP IP/OBS MODERATE 35: CPT | Performed by: STUDENT IN AN ORGANIZED HEALTH CARE EDUCATION/TRAINING PROGRAM

## 2025-07-03 RX ORDER — MAGNESIUM OXIDE 400 MG/1
400 TABLET ORAL ONCE
Status: COMPLETED | OUTPATIENT
Start: 2025-07-03 | End: 2025-07-03

## 2025-07-03 NOTE — CONSULTS
[facility]  CHEO Neurology Consult Note    Kendrick Valentin Patient Status:  Inpatient    1940 MRN [id number]   Location [facility] 3NE-A Attending Rocio Vigil MD   Hosp Day # 1 PCP Joann Lima DO     REASON FOR EVALUATION:  Dementia, traumatic subdural hematoma    HISTORY OF PRESENT ILLNESS:  This is a follow-up visit.  He is hospitalized for traumatic subdural hematoma and behavioral dyscontrol in the setting of advanced dementia of the Alzheimer type.    He is unable to participate in a conversation on examination as needed occasional yes/no.  When asked if he is feeling okay he eventually says \"yes.\"  When asked if he is noticing any problems he says \"no.\"    PAST MEDICAL HISTORY:  Past Medical History:    Dementia (HCC)    Depression    Essential hypertension    Osteoarthritis       PAST SURGICAL HISTORY:  History reviewed. No pertinent surgical history.    FAMILY HISTORY:  family history includes Hypertension in his father and sister; Other in his mother.    SOCIAL HISTORY:   reports that he has never smoked. He has never used smokeless tobacco. He reports that he does not drink alcohol and does not use drugs.    ALLERGIES:  Allergies   Allergen Reactions    Bee Venom UNKNOWN    Dust Mite Extract UNKNOWN    Seasonal UNKNOWN       MEDICATIONS:  No current outpatient medications on file.     Current Facility-Administered Medications   Medication Dose Route Frequency    acetaminophen (Tylenol) tab 650 mg  650 mg Oral Q4H PRN    Or    acetaminophen (Tylenol) rectal suppository 650 mg  650 mg Rectal Q4H PRN    hydrALAzine (Apresoline) 20 mg/mL injection 10 mg  10 mg Intravenous Q2H PRN    ondansetron (Zofran) 4 MG/2ML injection 4 mg  4 mg Intravenous Q6H PRN    metoclopramide (Reglan) 5 mg/mL injection 5 mg  5 mg Intravenous Q8H PRN    niCARdipine in sodium chloride 0.86% (carDENE) 20 mg/200mL infusion premix  5-15 mg/hr Intravenous Continuous PRN    allopurinol (Zyloprim) tab 100 mg  100 mg Oral  Daily    cetirizine (ZyrTEC) tab 5 mg  5 mg Oral Daily    DULoxetine (Cymbalta)  cap 30 mg  30 mg Oral Daily    levothyroxine (Synthroid) tab 50 mcg  50 mcg Oral Before breakfast    memantine (Namenda) tab 10 mg  10 mg Oral BID    atorvastatin (Lipitor) tab 10 mg  10 mg Oral Nightly    pantoprazole (Protonix) DR tab 40 mg  40 mg Oral QAM AC    Or    pantoprazole (Protonix) 40 mg in sodium chloride 0.9% PF 10 mL IV push  40 mg Intravenous QAM AC    amLODIPine (Norvasc) tab 10 mg  10 mg Oral Daily    donepezil (Aricept) tab 10 mg  10 mg Oral Nightly       REVIEW OF SYSTEMS:  A 10-point system was reviewed.  Pertinent positives and negatives are noted in HPI.      PHYSICAL EXAMINATION:  VITAL SIGNS: BP (!) 145/94 (BP Location: Left arm)   Pulse 96   Temp 98.1 °F (36.7 °C) (Oral)   Resp 18   Wt 188 lb 0.8 oz (85.3 kg)   SpO2 94%   BMI 29.45 kg/m²   GENERAL:  Patient is a 84 year old male in no acute distress.  Fairly restless, essentially constantly moving in his bed and picking at his blanket.  Not at all combative.    NEUROLOGICAL:   Mental status: He is awake, alert, and orients to whoever is speaking with him.  When asked simple questions he will occasionally answer with yes/no  Cranial Nerves: Conjugate gaze, face symmetric, tongue midline  Motor: He does not follow commands.  He moves all limbs spontaneously with no asymmetry.  Sensory: Unable to reliably assess.  Gait: Deferred    DIAGNOSTIC DATA:  Labs:  Recent Labs   Lab 07/02/25 0054 07/03/25  0634   RBC 4.90 4.81   HGB 17.0 17.0   HCT 46.0 45.1   MCV 93.9 93.8   MCH 34.7* 35.3*   MCHC 37.0 37.7*   RDW 14.0 14.8   NEPRELIM 9.75* 8.19*   WBC 12.6* 10.9   .0 264.0         Recent Labs   Lab 07/02/25 0054 07/03/25  0634   * 131*   BUN 25* 15   CREATSERUM 1.28 1.12   EGFRCR 55* 65   CA 9.2 9.4    139   K 4.4 4.0    104   CO2 22.0 22.0         IMAGING:  XR CHEST AP PORTABLE  (CPT=71045)  Result Date: 7/2/2025  CONCLUSION: There is  cardiomegaly with mild pulmonary vascular congestion and increased interstitial markings of the lungs that is concerning for changes related to congestive failure with mild pulmonary edema. Clinical correlation recommended. Electronically Verified and Signed by Attending Radiologist: Hiram Mendez MD 7/2/2025 9:55 AM Workstation: [id number]    CT BRAIN OR HEAD (CPT=70450)  Result Date: 7/2/2025  CONCLUSION: Stable mixed attenuation subdural hematoma left cerebral convexity, with stable midline shift and mass effect. No interval new bleeding since the prior exam from earlier this morning. Electronically Verified and Signed by Attending Radiologist: Ashwin Thomas MD 7/2/2025 9:16 AM Workstation: [id number]    CT SPINE CERVICAL (CPT=72125)  Result Date: 7/2/2025  CONCLUSION: No acute fracture or subluxation of the cervical spine within the limitations of the exam as above. Degenerative changes as above. Please see above for further details. A preliminary report was provided by the [facility] service.  Electronically Verified and Signed by Attending Radiologist: Hiram Mendez MD 7/2/2025 7:52 AM Workstation: [id number]    CT BRAIN OR HEAD (CPT=70450)  Result Date: 7/2/2025  CONCLUSION: 1.  Mixed attenuation subdural hematoma along the left cerebral convexity with reference thickness of approximately 2.0 cm is likely at least in part subacute. Mild to moderate local mass effect. 7 mm of left to right midline shift. 2.  No evidence of acute territorial infarction. Stable moderate chronic microvascular ischemic changes in the cerebral white matter. If there is clinical concern for acute ischemia/infarction, an MRI of the brain would be recommended for further evaluation. A preliminary report was provided by the [facility]eradiology service. Critical results were discussed with Dr. Ross by Dr. Caba at 3:20 a.m. at 7/2/2025. Critical results were read back. Electronically Verified and Signed by Attending  Radiologist: Hiram Mendez MD 7/2/2025 5:19 AM Workstation: [id number]        ASSESSMENT:  Mr. Valentin is an 85-year-old man with severe dementia of the Alzheimer type and falls with chronic subdural hematoma on the left.    PLAN:  Principal Problem:    Chronic subdural hematoma (HCC)  Active Problems:    Accelerated hypertension    Fall at home, initial encounter    Aphasia    Subacute subdural hematoma (HCC)    Severe Alzheimer's dementia (HCC)  If it is not within goals of care or indicated to operate on the subdural hematoma, then there is no role for serial neuroimaging.  He does not have obviously lateralizing symptoms and has so little mass effect, due to the tremendous atrophy, that I do not expect the hematoma to be imminently life-threatening.    It seems he will require a high level of care, such as a memory care unit, given his restlessness and impulsiveness.  Apropos of the current hospitalization, he will be a continuous fall risk.  Unfortunately there are no medications that will improve his cognitive function at this stage.  The best we can do is provide excellent supportive care and facilitate quality and regularity of sleep, nutrition, hydration with water, safe physical activity and day/night cycle integrity.    He can follow up with neurology when he is out of the hospital, if within goals of care.  He can transition to the next level of care at the discretion of his attending physician.  Neurology will sign off but call back should concerns arise.    Milton Campos MD

## 2025-07-03 NOTE — PROGRESS NOTES
Physician Clarification    Please clarify a diagnosis associated with the Midline Shift on Brain Imaging:     Midline Shift with Compression of the Brain     This note is part of the patient's medical record.

## 2025-07-03 NOTE — CM/SW NOTE
MINDA reviewed LUIS referral in [facility] and pt has no accepting LUIS facilities at this time. SW reopened LUIS referral and added additional providers to review.   Noted pt was made inpatient on 7/2/25 and would need three medically necessary inpatient nights in the hospital to qualify/meet criteria for Medicare for LUIS coverage under Medicare.     MINDA spoke with pt's dtr Mary via phone regarding discharge planning. MINDA informed pt's dtr pt would need thee medically necessary inpatient nights in the hospital to qualify for LUIS coverage under Medicare. SW inquired on discharge plan if pt is medically cleared prior to meeting Medicare criteria for LUIS. Pt's dtr stated plan is for LUIS and would like to pursue rehab. SW informed pt's dtr pt has had one inpatient night and would need two more medically necessary inpatient nights to qualify for LUIS coverage under Medicare. Pt's dtr inquired if pt's cognition, mobility, or clinical status would qualify pt for LUIS. SW deferred clinical questions to the treatment team. MINDA informed pt's dtr cognition alone is not a skillable need but stated unfortunately pt would need to meet the three inpatient nights for Medicare criteria. SW encouraged pt's dtr to consider a backup plan of respite at SNF or home w/additional care and HH. Pt's dtr stated if pt did not meet criteria for rehab he would discharge home with his spouse. Pt's dtr stated pt's family was hopeful pt would qualify for LUIS and then transition to LTC. Pt's dtr stated pt's spouse is having surgery next week as well. Discussed need for potential increase in caregiving. Pt's dtr stated pt has a medication for his sundowning that assists him with sleeping overnight, but since pt has been in the hospital he has not been sleeping. Pt's dtr stated if pt did not qualify for LUIS he would return home.   MINDA informed pt's dtr pt would need to be off a 1:1 sitter for at least 24 hours prior to discharge to [facility]. MINDA informed pt's dtr the [facility]  facility would not provide 1:1 care for pt, but pt's family can provide a caregiver for pt if needed.     SW informed pt's dtr LUIS choice list will be provided once available. Pt's dtr inquired if [facility], [facility], or [facility] is able to accept. SW reviewed LUIS referral and noted the facilities are unable to accept. Updated pt's dtr. SW informed pt's dtr this writer extended LUIS referral. Pt's dtr provided her husbands email address ([email or web address]) for LUIS choice list.   Updated RN.    Reviewed Medicare coverage for LUIS including need for medically necessary 3 midnight inpatient hospital stay.  Pt was made inpatient status on 7/2/2025 and would need to have a medical need to remain in the hospital until 7/5/2025 in order to have Medicare coverage for LUIS.  If pt does not meet this criteria, pt could elect to pay privately for NH.    LUIS referral pending in [facility], choice list will be provided once available. Pt will need to be off a 1:1 sitter and any physical/chemical restraint for at least 24 hours prior to discharge to SNF. MINDA will continue to follow.     Addendum (1pm) - MINDA emailed LUIS choice list to [email or web address]. MINDA will f/u with pt's family regarding SR choice.     ELY Matthews  Discharge Planner

## 2025-07-03 NOTE — PLAN OF CARE
Assumed care at 1930  Lethargic, MOE orientation, pt not able to follow commands, limited speech 1-2 words  RA  1:1 sitter for safety  Neuro q4  QID accu checks  BP elevated PRN given, see MAR & flowsheets for details  NSR on tele  Cardiac electrolyte protocol  Continent, incontinent at times   Up w/ RW x2 assistance to the bathroom  Pills whole, one at a time  Regular, thin liquid diet   Denies pain  Needs met at this time, call light within reach, bed lowered and locked, sitter at bedside, plan of care ongoing      Problem: NEUROLOGICAL - ADULT  Goal: Achieves stable or improved neurological status  Description: INTERVENTIONS  - Assess for and report changes in neurological status  - Initiate measures to prevent increased intracranial pressure  - Maintain blood pressure and fluid volume within ordered parameters to optimize cerebral perfusion and minimize risk of hemorrhage  - Monitor temperature, glucose, and sodium. Initiate appropriate interventions as ordered  Outcome: Progressing  Goal: Absence of seizures  Description: INTERVENTIONS  - Monitor for seizure activity  - Administer anti-seizure medications as ordered  - Monitor neurological status  Outcome: Progressing     Problem: Impaired Cognition  Goal: Patient will exhibit improved attention, thought processing and/or memory  Description: Interventions:  - Allow additional time for processing after asking questions or providing instructions  Outcome: Progressing

## 2025-07-03 NOTE — PROGRESS NOTES
[facility]   part of [facility]     Hospitalist Progress Note     Kendrick Valentin Patient Status:  Inpatient    1940 MRN [id number]   Location [facility] 3NE-A Attending Rocio Vigil MD   Hosp Day # 1 PCP Joann Lima DO     Chief Complaint: SDH     Subjective:     Patient cannot answer questions, constantly moving.     Objective:    Review of Systems:   A comprehensive review of systems was completed; pertinent positive and negatives stated in subjective.    Vital signs:  Temp:  [97.5 °F (36.4 °C)-98.5 °F (36.9 °C)] 98.5 °F (36.9 °C)  Pulse:  [71-97] 90  Resp:  [12-31] 22  BP: (120-181)/() 142/74  SpO2:  [94 %-98 %] 94 %    Physical Exam:    General: No acute distress  Respiratory: No wheezes, no rhonchi  Cardiovascular: S1, S2, regular rate and rhythm  Abdomen: Soft, Non-tender, non-distended, positive bowel sounds  Neuro: No new focal deficits.   Extremities: No edema      Diagnostic Data:    Labs:  Recent Labs   Lab 25  0634   WBC 12.6* 10.9   HGB 17.0 17.0   MCV 93.9 93.8   .0 264.0   INR 0.98  --        Recent Labs   Lab 25  0634   * 131*   BUN 25* 15   CREATSERUM 1.28 1.12   CA 9.2 9.4   ALB 4.5 4.1    139   K 4.4 4.0    104   CO2 22.0 22.0   ALKPHO 76 78   AST 35* 19   ALT 18 13   BILT 0.4 1.0   TP 7.3 6.9       Estimated Glomerular Filtration Rate: 65 mL/min/1.73m2 (result from lab).    Recent Labs   Lab 25   TROPHS 8       Recent Labs   Lab 25   PTP 13.1   INR 0.98                  Microbiology    No results found for this visit on 25.      Imaging: Reviewed in Epic.    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 UA negative. Trend     #Hypertension      #Severe alzheimer's dementia-  Continue memantine  Donepezil increased  Neuro to eval     #HLD    #Depression    #Hypothyroidism    #GERD    #Gout    Tried to call Dtr Mary 11:47 AM on 7/3/25- went to .     Aleena Loyd MD    Supplementary Documentation:     Quality:  DVT Mechanical Prophylaxis:   SCDs,    DVT Pharmacologic Prophylaxis   Medication   None                Code Status: Full Code  Paiz: External urinary catheter in place  Paiz Duration (in days):   Central line:    TORREY:     Discharge is dependent on: clinical   At this point Mr. Valentin is expected to be discharge to: rehab?    The 21st Century Cures Act makes medical notes like these available to patients in the interest of transparency. Please be advised this is a medical document. Medical documents are intended to carry relevant information, facts as evident, and the clinical opinion of the practitioner. The medical note is intended as peer to peer communication and may appear blunt or direct. It is written in medical language and may contain abbreviations or verbiage that are unfamiliar.              **Certification      PHYSICIAN Certification of Need for Inpatient Hospitalization - Initial Certification    Patient will require inpatient services that will reasonably be expected to span two midnight's based on the clinical documentation in H+P.   Based on patients current state of illness, I anticipate that, after discharge, patient will require TBD.         [1]    magnesium oxide  400 mg Oral Once    allopurinol  100 mg Oral Daily    cetirizine  5 mg Oral Daily    DULoxetine  30 mg Oral Daily    levothyroxine  50 mcg Oral Before breakfast    memantine  10 mg Oral BID    atorvastatin  10 mg Oral Nightly    pantoprazole  40 mg Oral QAM AC    Or    pantoprazole  40 mg Intravenous QAM AC    amLODIPine  10 mg Oral Daily    donepezil  10 mg Oral Nightly

## 2025-07-03 NOTE — CONGREGATE LIVING REVIEW
[facility] Living Authorization    The [facility] Review Committee has reviewed this case and the patient IS APPROVED for discharge to a facility for Short Term Skilled once the following procedure is followed:     - The physician discharge instructions (contained within the ROSE MARIE note for SNF) must inlcude the below appropriate and approved COVID instructions to the facility    For questions regarding CLRC approval process, please contact the CM assigned to the case.  For questions regarding RN discharge workflow, please contact the unit Clinical Leader.

## 2025-07-03 NOTE — PLAN OF CARE
Assumed care at 0730. MOE orientation status, pt not talking. RA, VSS, NSR/ST at times on tele. Lab draw.PIV in R FA and R AC flushed and capped. Dressings CDI. Covered with netting to prevent pt from removing. Regular diet, pills whole one at a time. Incontinent at times, brief on. Ambulating 1 assist with walker. No signs of pain. Neuro checks Q4, limited exam since patient follows very few commands.      Problem: NEUROLOGICAL - ADULT  Goal: Achieves stable or improved neurological status  Description: INTERVENTIONS  - Assess for and report changes in neurological status  - Initiate measures to prevent increased intracranial pressure  - Maintain blood pressure and fluid volume within ordered parameters to optimize cerebral perfusion and minimize risk of hemorrhage  - Monitor temperature, glucose, and sodium. Initiate appropriate interventions as ordered  Outcome: Progressing  Goal: Absence of seizures  Description: INTERVENTIONS  - Monitor for seizure activity  - Administer anti-seizure medications as ordered  - Monitor neurological status  Outcome: Progressing     Problem: Impaired Cognition  Goal: Patient will exhibit improved attention, thought processing and/or memory  Description: Interventions:  - Minimize distractions in the room when full attention is required  Outcome: Progressing

## 2025-07-04 LAB
GLUCOSE BLD-MCNC: 103 MG/DL (ref 70–99)
GLUCOSE BLD-MCNC: 107 MG/DL (ref 70–99)
GLUCOSE BLD-MCNC: 133 MG/DL (ref 70–99)
GLUCOSE BLD-MCNC: 92 MG/DL (ref 70–99)
MAGNESIUM SERPL-MCNC: 1.9 MG/DL (ref 1.6–2.6)

## 2025-07-04 PROCEDURE — 99232 SBSQ HOSP IP/OBS MODERATE 35: CPT | Performed by: STUDENT IN AN ORGANIZED HEALTH CARE EDUCATION/TRAINING PROGRAM

## 2025-07-04 NOTE — PROGRESS NOTES
[facility]   part of [facility]     Hospitalist Progress Note     Kendrick Valentin Patient Status:  Inpatient    1940 MRN [id number]   Location [facility] 3NE-A Attending Fan, Papa Claros, *   Hosp Day # 2 PCP Joann Lima DO     Chief Complaint: Fall    Subjective:      - Pt arousable, and moving extremities spontaneously, but unable to answer questions or follow commands   - Per sitter at bedside, no significant agitation noted     Objective:    Review of Systems:   A comprehensive review of systems was completed; pertinent positive and negatives stated in subjective.    Vital signs:  Temp:  [97.8 °F (36.6 °C)-98.3 °F (36.8 °C)] 98 °F (36.7 °C)  Pulse:  [91-97] 94  Resp:  [18-26] 26  BP: (136-146)/(83-99) 146/91  SpO2:  [94 %-95 %] 95 %    Physical Exam:    General: No acute distress  Respiratory: no wheezes, no rhonchi  Cardiovascular: S1, S2, regular rate and rhythm  Abdomen: Soft, Non-tender, non-distended, positive bowel sounds  Neuro: No new focal deficits.   Extremities: no edema    Diagnostic Data:    Labs:  Recent Labs   Lab 25  0634   WBC 12.6* 10.9   HGB 17.0 17.0   MCV 93.9 93.8   .0 264.0   INR 0.98  --        Recent Labs   Lab 25  0634   * 131*   BUN 25* 15   CREATSERUM 1.28 1.12   CA 9.2 9.4   ALB 4.5 4.1    139   K 4.4 4.0    104   CO2 22.0 22.0   ALKPHO 76 78   AST 35* 19   ALT 18 13   BILT 0.4 1.0   TP 7.3 6.9       Estimated Creatinine Clearance: 45.9 mL/min (based on SCr of 1.12 mg/dL).    Recent Labs   Lab 25  005   TROPHS 8       Recent Labs   Lab 25   PTP 13.1   INR 0.98                  Microbiology    No results found for this visit on 25.      Imaging: Reviewed in Epic.    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 and brain compression  -Neurosurgery  consult- no surgical intervention   -PT, OT - pending LUIS placement   -Repeat CT brain stable   -s/p Nicardipine drip      #SIRS  #Leukocytosis, reactive, resolved      #Hypertension      #Severe alzheimer's dementia-  Continue memantine  Donepezil increased  Neuro signed off, plan for LUIS placement   Will attempt to wean off sitter and monitor     #HLD     #Depression     #Hypothyroidism     #GERD     #Gout    Papa Chavira MD    Supplementary Documentation:     Quality:  DVT Mechanical Prophylaxis:   SCDs,    DVT Pharmacologic Prophylaxis   Medication   None                Code Status: Full Code  Paiz: External urinary catheter in place  Paiz Duration (in days):   Central line:    TORREY:     The 21st Century Cures Act makes medical notes like these available to patients in the interest of transparency. Please be advised this is a medical document. Medical documents are intended to carry relevant information, facts as evident, and the clinical opinion of the practitioner. The medical note is intended as peer to peer communication and may appear blunt or direct. It is written in medical language and may contain abbreviations or verbiage that are unfamiliar.                       [1]    allopurinol  100 mg Oral Daily    cetirizine  5 mg Oral Daily    DULoxetine  30 mg Oral Daily    levothyroxine  50 mcg Oral Before breakfast    memantine  10 mg Oral BID    atorvastatin  10 mg Oral Nightly    pantoprazole  40 mg Oral QAM AC    Or    pantoprazole  40 mg Intravenous QAM AC    amLODIPine  10 mg Oral Daily    donepezil  10 mg Oral Nightly

## 2025-07-04 NOTE — SLP NOTE
SPEECH DAILY NOTE - INPATIENT    ASSESSMENT & PLAN   ASSESSMENT  Patient in bed, eating lunch upon arrival. Wife and caregiver present at bedside. Patient able to self-feed but also requires some assistance. Patient was seen with thin liquids via straw x2oz and straw x5 and hard solid x6. Slightly reduced bolus formation of hard solids observed, requiring liquid wash to assist with bolus formation and oral clearance. However, appeared functional and safe. No s/s of aspiration with all consistencies provided. Wife reports patient tends to \"studd\" his mouth at time at home. Discussed eating/drinking slowly, taking small bites/drinks, clearing mouth before taking another bite, and sitting upright for all eating/drinking. Wife reports patient is minimally verbal at baseline however has said a few words today as \"the doctor upped his medicine.\" Patient requires some assistance with feeding. Wife verbalized understanding of education. Patient does not require additional ST at this time. Ok to continue general solids and thin liquids, discharge from ST.    Diet Recommendations - Solids: Regular  Diet Recommendations - Liquids: Thin Liquids       Aspiration Precautions: Upright position, Slow rate, Small bites, Small sips, 1:1 feeding  Medication Administration Recommendations: One pill at a time    Patient Experiencing Pain: No    Treatment Plan  Treatment Plan/Recommendations: No further inpatient SLP service warranted    Interdisciplinary Communication: Discussed with RN      GOALS  Goal #1 The patient will tolerate regular consistency and thin liquids without overt signs or symptoms of aspiration with 95 % accuracy over 1-2 session(s).  In Progress   Goal #2 The patient/family/caregiver will demonstrate understanding and implementation of aspiration precautions and swallow strategies independently over 1-2 session(s).     In Progress        FOLLOW UP  Follow Up Needed (Documentation Required): No  SLP Follow-up Date:  07/03/25  Duration: 1 week    Session: 2    If you have any questions, please contact Tory Lauren, SLP

## 2025-07-04 NOTE — PLAN OF CARE
Assumed care at 1930. 1:1 sitter at the bedside. Pt alert, MOE orientation. Minimal verbal responses. RA. NSR/ST on tele. Regular diet, QID accuchecks. Pills one at a time. Cardiac electrolyte protocol. PIV SL. Daily wt. Neuro checks Q4H, pt not participating in exam. Up with x1 assist and a walker. Pt does not appear to be in pain at this time. Safety precautions in place, call light within reach. Will continue with plan of care.      Problem: NEUROLOGICAL - ADULT  Goal: Achieves stable or improved neurological status  Description: INTERVENTIONS  - Assess for and report changes in neurological status  - Initiate measures to prevent increased intracranial pressure  - Maintain blood pressure and fluid volume within ordered parameters to optimize cerebral perfusion and minimize risk of hemorrhage  - Monitor temperature, glucose, and sodium. Initiate appropriate interventions as ordered  Outcome: Progressing  Goal: Absence of seizures  Description: INTERVENTIONS  - Monitor for seizure activity  - Administer anti-seizure medications as ordered  - Monitor neurological status  Outcome: Progressing     Problem: Impaired Cognition  Goal: Patient will exhibit improved attention, thought processing and/or memory  Description: Interventions:  - Minimize distractions in the room when full attention is required  Outcome: Progressing

## 2025-07-04 NOTE — PLAN OF CARE
Assumed pt care this morning at 0730.   Pt is A&OX0, pt is awake, does not follow commands at all times.  On room air, lungs sounds clear. VSS.  Tele: NSR, some ST elevation seen, script printed and left in the chart. Hospitalist notified and EKG completed.   No facial grimaces noticed.   [id number], unable to complete full assessment, see flowsheet.   1:1 care companion discontinued at 1045.   Aspiration precautions.   Cardiac replacement protocol.   Reg diet with QID accuchecks, good appetite. Pills whole one at a time with thin liquids.   Purewick in place. Incontinent of bowel and bladder.   L forearm PIV saline lock.   L AC PIV saline lock.   Bed in lowest position, call light within reach, bed alarm on.       Problem: NEUROLOGICAL - ADULT  Goal: Achieves stable or improved neurological status  Description: INTERVENTIONS  - Assess for and report changes in neurological status  - Initiate measures to prevent increased intracranial pressure  - Maintain blood pressure and fluid volume within ordered parameters to optimize cerebral perfusion and minimize risk of hemorrhage  - Monitor temperature, glucose, and sodium. Initiate appropriate interventions as ordered  Outcome: Progressing  Goal: Absence of seizures  Description: INTERVENTIONS  - Monitor for seizure activity  - Administer anti-seizure medications as ordered  - Monitor neurological status  Outcome: Progressing     Problem: Impaired Cognition  Goal: Patient will exhibit improved attention, thought processing and/or memory  Description: Interventions:  - Minimize distractions in the room when full attention is required  Outcome: Progressing

## 2025-07-05 LAB
ATRIAL RATE: 92 BPM
GLUCOSE BLD-MCNC: 118 MG/DL (ref 70–99)
GLUCOSE BLD-MCNC: 123 MG/DL (ref 70–99)
GLUCOSE BLD-MCNC: 145 MG/DL (ref 70–99)
P AXIS: 28 DEGREES
P-R INTERVAL: 206 MS
Q-T INTERVAL: 368 MS
QRS DURATION: 90 MS
QTC CALCULATION (BEZET): 455 MS
R AXIS: -33 DEGREES
T AXIS: 10 DEGREES
VENTRICULAR RATE: 92 BPM

## 2025-07-05 PROCEDURE — 99232 SBSQ HOSP IP/OBS MODERATE 35: CPT | Performed by: STUDENT IN AN ORGANIZED HEALTH CARE EDUCATION/TRAINING PROGRAM

## 2025-07-05 RX ORDER — HALOPERIDOL 5 MG/ML
1 INJECTION INTRAMUSCULAR EVERY 6 HOURS PRN
Status: DISCONTINUED | OUTPATIENT
Start: 2025-07-05 | End: 2025-07-06

## 2025-07-05 RX ORDER — HYDRALAZINE HYDROCHLORIDE 25 MG/1
25 TABLET, FILM COATED ORAL EVERY 8 HOURS PRN
Status: DISCONTINUED | OUTPATIENT
Start: 2025-07-05 | End: 2025-07-06

## 2025-07-05 RX ORDER — QUETIAPINE FUMARATE 25 MG/1
25 TABLET, FILM COATED ORAL 3 TIMES DAILY PRN
Status: DISCONTINUED | OUTPATIENT
Start: 2025-07-05 | End: 2025-07-05

## 2025-07-05 RX ORDER — LOSARTAN POTASSIUM 25 MG/1
25 TABLET ORAL DAILY
Status: DISCONTINUED | OUTPATIENT
Start: 2025-07-05 | End: 2025-07-06

## 2025-07-05 RX ORDER — QUETIAPINE FUMARATE 25 MG/1
25 TABLET, FILM COATED ORAL 3 TIMES DAILY
Status: DISCONTINUED | OUTPATIENT
Start: 2025-07-05 | End: 2025-07-06

## 2025-07-05 RX ORDER — QUETIAPINE FUMARATE 25 MG/1
25 TABLET, FILM COATED ORAL ONCE
Status: COMPLETED | OUTPATIENT
Start: 2025-07-05 | End: 2025-07-05

## 2025-07-05 NOTE — CM/SW NOTE
Received update from RN that family had chose [facility] for rehab. Reserved in [facility]. Awaiting to see if pt can admit today.     Ellen MCGILL, LCSW  Discharge Planner

## 2025-07-05 NOTE — PROGRESS NOTES
Assumed care around 0730, Alert to self, very agitated this AM. Seroquel given with good effect. Remained drowsy most of day. Afternoon seroquel held. Patient has been sitter free since yesterday around 10:45. Hopeful for discharge tomorrow. Plan of care ongoing.

## 2025-07-05 NOTE — PHYSICAL THERAPY NOTE
Attempted to see pt for PT, however RN requesting PT defer at this time. Will re attempt as able/appropriate.       Flora Norwood, PT, DPT  07/05/25

## 2025-07-05 NOTE — PROGRESS NOTES
[facility]   part of [facility]     Hospitalist Progress Note     Kendrick Valentin Patient Status:  Inpatient    1940 MRN [id number]   Location [facility] 3NE-A Attending Fan, Papa Claros, *   Hosp Day # 3 PCP Joann Lima DO     Chief Complaint: Fall    Subjective:      - Pt agitated overnight and this AM, received oral seroquel with good response   - Unable to provide additional history     Objective:    Review of Systems:   A comprehensive review of systems was completed; pertinent positive and negatives stated in subjective.    Vital signs:  Temp:  [97.3 °F (36.3 °C)-98.1 °F (36.7 °C)] 97.3 °F (36.3 °C)  Pulse:  [] 108  Resp:  [18-37] 23  BP: (144-166)/() 147/85  SpO2:  [91 %-96 %] 94 %    Physical Exam:    General: No acute distress  Respiratory: no wheezes, no rhonchi  Cardiovascular: S1, S2, regular rate and rhythm  Abdomen: Soft, Non-tender, non-distended, positive bowel sounds  Neuro: No new focal deficits.   Extremities: no edema    Diagnostic Data:    Labs:  Recent Labs   Lab 25  0634   WBC 12.6* 10.9   HGB 17.0 17.0   MCV 93.9 93.8   .0 264.0   INR 0.98  --        Recent Labs   Lab 25  0634   * 131*   BUN 25* 15   CREATSERUM 1.28 1.12   CA 9.2 9.4   ALB 4.5 4.1    139   K 4.4 4.0    104   CO2 22.0 22.0   ALKPHO 76 78   AST 35* 19   ALT 18 13   BILT 0.4 1.0   TP 7.3 6.9       Estimated Creatinine Clearance: 45.9 mL/min (based on SCr of 1.12 mg/dL).    Recent Labs   Lab 25  005   TROPHS 8       Recent Labs   Lab 25   PTP 13.1   INR 0.98                  Microbiology    No results found for this visit on 25.      Imaging: Reviewed in Epic.    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 and brain compression  -Neurosurgery consult- no surgical intervention   -PT, OT  - pending LUIS placement   -Repeat CT brain stable   -s/p Nicardipine drip      #SIRS  #Leukocytosis, reactive, resolved      #Hypertension    - continue amlodipine, adding losartan now     #Severe alzheimer's dementia-  Continue memantine  Donepezil increased  Neuro signed off, plan for LUIS placement   Adding scheduled seroquel for agitation today     #HLD     #Depression     #Hypothyroidism     #GERD     #Gout    Dispo planning to LUIS     Papa Chavira MD    Supplementary Documentation:     Quality:  DVT Mechanical Prophylaxis:   SCDs,    DVT Pharmacologic Prophylaxis   Medication   None                Code Status: Full Code  Paiz: External urinary catheter in place  Paiz Duration (in days):   Central line:    TORREY:     The 21st Century Cures Act makes medical notes like these available to patients in the interest of transparency. Please be advised this is a medical document. Medical documents are intended to carry relevant information, facts as evident, and the clinical opinion of the practitioner. The medical note is intended as peer to peer communication and may appear blunt or direct. It is written in medical language and may contain abbreviations or verbiage that are unfamiliar.                       [1]    allopurinol  100 mg Oral Daily    cetirizine  5 mg Oral Daily    DULoxetine  30 mg Oral Daily    levothyroxine  50 mcg Oral Before breakfast    memantine  10 mg Oral BID    atorvastatin  10 mg Oral Nightly    pantoprazole  40 mg Oral QAM AC    Or    pantoprazole  40 mg Intravenous QAM AC    amLODIPine  10 mg Oral Daily    donepezil  10 mg Oral Nightly

## 2025-07-05 NOTE — PLAN OF CARE
Assumed care at 1930  Aspiration precautions  MOE orientation as pt does not answer staff questions   Q4 neuro checks, MOE a lot of aspects as pt does not follow commands, only at times   Pt spoke to this RN, said \"how are you\" and \"you look like my granddaughter\" but would not answer questions. Speech clear   RA  VSS  NSR/ST on tele   Eliquis for VTE   Cardiac Electrolyte protocol   Regular diet   Incontinent x2   Primo in place   Qid accu checks   CNPI for pain, no indications of pain   Up with 1 and a walker   Left FA PIV sl   All safety precautions are in place.     0016: pts /115 (127) with rechecks. PRN hydralazine given - see MAR for details.     0045: recheck after hydralazine was 166/103 (116). Pt agitated and restless during BP checks. MD made aware, no new orders. Per MD, okay to let patient sleep and not treat BP.     0400: /103 (123). PRN hydralazine given - see MAR for details. Increased agitation and restlessness. Busy apron given.    0455:  BP recheck after hydralazine 147/85 (103). Pt has not slept at all tonight.     0600: pt increasingly agitated and restless, pulling off tele, pulling at IV and continuing to get out of bed. MD made aware, seroquel ordered.     Problem: NEUROLOGICAL - ADULT  Goal: Achieves stable or improved neurological status  Description: INTERVENTIONS  - Assess for and report changes in neurological status  - Initiate measures to prevent increased intracranial pressure  - Maintain blood pressure and fluid volume within ordered parameters to optimize cerebral perfusion and minimize risk of hemorrhage  - Monitor temperature, glucose, and sodium. Initiate appropriate interventions as ordered  Outcome: Progressing  Goal: Absence of seizures  Description: INTERVENTIONS  - Monitor for seizure activity  - Administer anti-seizure medications as ordered  - Monitor neurological status  Outcome: Progressing     Problem: Impaired Cognition  Goal: Patient will exhibit improved  attention, thought processing and/or memory  Description: Interventions:  - Minimize distractions in the room when full attention is required  Outcome: Progressing

## 2025-07-06 VITALS
TEMPERATURE: 98 F | SYSTOLIC BLOOD PRESSURE: 136 MMHG | BODY MASS INDEX: 29 KG/M2 | RESPIRATION RATE: 20 BRPM | OXYGEN SATURATION: 96 % | DIASTOLIC BLOOD PRESSURE: 108 MMHG | HEART RATE: 87 BPM | WEIGHT: 183.38 LBS

## 2025-07-06 LAB
GLUCOSE BLD-MCNC: 101 MG/DL (ref 70–99)
GLUCOSE BLD-MCNC: 103 MG/DL (ref 70–99)

## 2025-07-06 PROCEDURE — 99239 HOSP IP/OBS DSCHRG MGMT >30: CPT | Performed by: STUDENT IN AN ORGANIZED HEALTH CARE EDUCATION/TRAINING PROGRAM

## 2025-07-06 RX ORDER — DONEPEZIL HYDROCHLORIDE 10 MG/1
10 TABLET, FILM COATED ORAL NIGHTLY
Qty: 30 TABLET | Refills: 0 | Status: SHIPPED | OUTPATIENT
Start: 2025-07-06 | End: 2025-08-05

## 2025-07-06 RX ORDER — QUETIAPINE FUMARATE 25 MG/1
25 TABLET, FILM COATED ORAL 3 TIMES DAILY PRN
Qty: 90 TABLET | Refills: 0 | Status: SHIPPED | OUTPATIENT
Start: 2025-07-06 | End: 2025-08-05

## 2025-07-06 RX ORDER — LOSARTAN POTASSIUM 25 MG/1
25 TABLET ORAL DAILY
Qty: 30 TABLET | Refills: 0 | Status: SHIPPED | OUTPATIENT
Start: 2025-07-07 | End: 2025-08-06

## 2025-07-06 NOTE — PROGRESS NOTES
[facility]   part of [facility]     Hospitalist Progress Note     Kendrick Valentin Patient Status:  Inpatient    1940 MRN [id number]   Location [facility] 3NE-A Attending Fan, Papa Claros, *   Hosp Day # 4 PCP Joann Lima DO     Chief Complaint: Fall    Subjective:      - Pt doing well, no further agitation noted    - Sleeping comfortably today, unable to provide additional history     Objective:    Review of Systems:   A comprehensive review of systems was completed; pertinent positive and negatives stated in subjective.    Vital signs:  Temp:  [97 °F (36.1 °C)-98.4 °F (36.9 °C)] 97 °F (36.1 °C)  Pulse:  [71-99] 71  Resp:  [16-21] 18  BP: ()/(67-96) 137/87  SpO2:  [91 %-100 %] 95 %    Physical Exam:    General: No acute distress  Respiratory: no wheezes, no rhonchi  Cardiovascular: S1, S2, regular rate and rhythm  Abdomen: Soft, Non-tender, non-distended, positive bowel sounds  Neuro: No new focal deficits.   Extremities: no edema    Diagnostic Data:    Labs:  Recent Labs   Lab 25  0634   WBC 12.6* 10.9   HGB 17.0 17.0   MCV 93.9 93.8   .0 264.0   INR 0.98  --        Recent Labs   Lab 25  0634   * 131*   BUN 25* 15   CREATSERUM 1.28 1.12   CA 9.2 9.4   ALB 4.5 4.1    139   K 4.4 4.0    104   CO2 22.0 22.0   ALKPHO 76 78   AST 35* 19   ALT 18 13   BILT 0.4 1.0   TP 7.3 6.9       Estimated Creatinine Clearance: 45.9 mL/min (based on SCr of 1.12 mg/dL).    Recent Labs   Lab 25   TROPHS 8       Recent Labs   Lab 25   PTP 13.1   INR 0.98                  Microbiology    No results found for this visit on 25.      Imaging: Reviewed in Epic.    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 and brain compression  -Neurosurgery consult- no surgical intervention   -PT, OT - pending [facility]  placement   -Repeat CT brain stable   -s/p Nicardipine drip      #SIRS  #Leukocytosis, reactive, resolved      #Hypertension    - continue amlodipine, adding losartan now     #Severe alzheimer's dementia-  Continue memantine  Donepezil increased  Neuro signed off, plan for [facility] placement   Adding scheduled seroquel for agitation     #HLD     #Depression     #Hypothyroidism     #GERD     #Gout    Dispo planning to [facility]     Papa Chavira MD    Supplementary Documentation:     Quality:  DVT Mechanical Prophylaxis:   SCDs,    DVT Pharmacologic Prophylaxis   Medication   None                Code Status: Full Code  Paiz: External urinary catheter in place  Paiz Duration (in days):   Central line:    TORRYE: 7/6/2025    The 21st Century Cures Act makes medical notes like these available to patients in the interest of transparency. Please be advised this is a medical document. Medical documents are intended to carry relevant information, facts as evident, and the clinical opinion of the practitioner. The medical note is intended as peer to peer communication and may appear blunt or direct. It is written in medical language and may contain abbreviations or verbiage that are unfamiliar.                       [1]    losartan  25 mg Oral Daily    QUEtiapine  25 mg Oral TID    allopurinol  100 mg Oral Daily    cetirizine  5 mg Oral Daily    DULoxetine  30 mg Oral Daily    levothyroxine  50 mcg Oral Before breakfast    memantine  10 mg Oral BID    atorvastatin  10 mg Oral Nightly    pantoprazole  40 mg Oral QAM AC    Or    pantoprazole  40 mg Intravenous QAM AC    amLODIPine  10 mg Oral Daily    donepezil  10 mg Oral Nightly

## 2025-07-06 NOTE — DISCHARGE PLANNING
NURSING DISCHARGE NOTE    Discharged Rehab facility via Ambulance.  Accompanied by Support staff  Belongings Taken by patient/family.  IV and tele removed.  Report given to RN at [facility].   Updated Pts wife and daughter about pts discharge.

## 2025-07-06 NOTE — PLAN OF CARE
Assumed patient care at 0730. A/OxUTA, nonverbal. Neuro checks. Room air. NSR on tele. Regular diet, pills taken whole. Incontinent. Up with 1 and walker. Left forearm PIV SL. Plan is for discharge to [facility], attempted to call dtr jared to update, left a message. All safety precatuions are in place and will continue with plan of care.    Problem: NEUROLOGICAL - ADULT  Goal: Achieves stable or improved neurological status  Description: INTERVENTIONS  - Assess for and report changes in neurological status  - Initiate measures to prevent increased intracranial pressure  - Maintain blood pressure and fluid volume within ordered parameters to optimize cerebral perfusion and minimize risk of hemorrhage  - Monitor temperature, glucose, and sodium. Initiate appropriate interventions as ordered  Outcome: Progressing  Goal: Absence of seizures  Description: INTERVENTIONS  - Monitor for seizure activity  - Administer anti-seizure medications as ordered  - Monitor neurological status  Outcome: Progressing     Problem: Impaired Cognition  Goal: Patient will exhibit improved attention, thought processing and/or memory  Description: Interventions:  - Minimize distractions in the room when full attention is required  Outcome: Progressing      Hydroxyzine Pregnancy And Lactation Text: This medication is not safe during pregnancy and should not be taken. It is also excreted in breast milk and breast feeding isn't recommended.

## 2025-07-06 NOTE — CM/SW NOTE
07/06/25 1048   Discharge disposition   Expected discharge disposition subacute   Post Acute Care Provider Mis Mckinnon   Discharge transportation [facility]     [address] [address]  Phone: [phone]  Fax: [phone]    RN to call report to Phone: [phone]      [facility] to transport at 2PM. PCS complete.     ALEKSANDAR Mayes RN, CM  [phone]

## 2025-07-06 NOTE — PLAN OF CARE
Assumed patient care @1930  MOE orientation status as pt does not answer staff questions   VSS, NSR on tele, on RA  Q4 Neuro checks, MOE most of assessment as pt only follows commands at times  Cardiac electrolyte replacement protocol  L forearm PIV, SL  Regular diet, pills whole in applesauce  QID Accu checks  Incontinent x2  Brief and primo in place  Up 1 assist with walker  Fall, aspiration and seizure precautions in place  Bed alarm on, in lowest position, call light within reach and all needs met at this time    Problem: NEUROLOGICAL - ADULT  Goal: Achieves stable or improved neurological status  Description: INTERVENTIONS  - Assess for and report changes in neurological status  - Initiate measures to prevent increased intracranial pressure  - Maintain blood pressure and fluid volume within ordered parameters to optimize cerebral perfusion and minimize risk of hemorrhage  - Monitor temperature, glucose, and sodium. Initiate appropriate interventions as ordered  Outcome: Progressing  Goal: Absence of seizures  Description: INTERVENTIONS  - Monitor for seizure activity  - Administer anti-seizure medications as ordered  - Monitor neurological status  Outcome: Progressing     Problem: Impaired Cognition  Goal: Patient will exhibit improved attention, thought processing and/or memory  Description: Interventions:  - Minimize distractions in the room when full attention is required  Outcome: Progressing

## 2025-07-07 NOTE — DISCHARGE SUMMARY
[facility] HOSPITALIST  DISCHARGE SUMMARY     Kendrick Valentin Patient Status:  Inpatient    1940 MRN [id number]   Location [facility] 3NE-A Attending No att. providers found   Hosp Day # 4 PCP Joann Lima DO     Date of Admission: 2025  Date of Discharge: 2025  Discharge Disposition: SNF Subacute Rehab    Admitting Diagnosis:   Chronic subdural hematoma (HCC) [I62.03]    Hospital Discharge Diagnoses:   Unwitnessed fall  Subacute to chronic subdural hematoma with mass effect and midline shift  Hypertension  Severe Alzheimer's dementia  Hyperlipidemia  Depression  Hypothyroidism  GERD  Gout    Lace+ Score: 68  59-90 High Risk  29-58 Medium Risk  0-28   Low Risk.    TCM Follow-Up Recommendation:  LACE > 58: High Risk of readmission after discharge from the hospital.        Discharge Diagnosis:   Fall with subacute to chronic subdural hematoma    History of Present Illness: Kendrick Valentin is a 84 year old male with history of severe alzheimer's dementia, HTN, HLD, depression, hypothyroidism, GERD, gout presented after a fall.      Patient suffered an unwitnessed fall from bed. Spouse heard thud and found the patient on the floor. EMS were called.      Patient tachycardic to 100s and hypertensive. EKG with sinus tachycardia. CT brain with subacute to chronic subdural hematoma with mass effect and midline shift. Neurosurgery consulted. Patient was placed on nicardipine drip. Patient was agitated in the ED requiring ativan.      During my assessment, patient lethargic.     Brief Synopsis: Patient initially presented following a fall, CT brain findings with subacute to chronic subdural hematoma, neurosurgery consulted and recommended no acute surgical intervention after discussion of management options with family, repeat CT head stable.  Patient with severe baseline dementia, neurology consulted and recommended no further management based on family's goals of care.  Patient evaluated by physical therapy and  Occupational Therapy and recommended for discharge to [facility].  Patient discharged to [facility] with outpatient PCP follow-up. All diagnoses discussed with patient's wife, they demonstrated understanding and plan of care and risks reviewed and in agreement.     Procedures during hospitalization:       Incidental or significant findings and recommendations (brief descriptions):      Lab/Test results pending at Discharge:       Consultants:  Neurology, neurocritical care, neurosurgery    Discharge Medication List:     Discharge Medications        START taking these medications        Instructions Prescription details   losartan 25 MG Tabs  Commonly known as: Cozaar      Take 1 tablet (25 mg total) by mouth daily.   Stop taking on: August 6, 2025  Quantity: 30 tablet  Refills: 0     QUEtiapine 25 MG Tabs  Commonly known as: SEROquel      Take 1 tablet (25 mg total) by mouth 3 (three) times daily as needed.   Stop taking on: August 5, 2025  Quantity: 90 tablet  Refills: 0            CHANGE how you take these medications        Instructions Prescription details   amLODIPine 5 MG Tabs  Commonly known as: Norvasc  What changed: how much to take      TAKE 1 TABLET BY MOUTH  DAILY   Quantity: 90 tablet  Refills: 1     donepezil 10 MG Tabs  Commonly known as: Aricept  What changed:   medication strength  how much to take      Take 1 tablet (10 mg total) by mouth nightly.   Stop taking on: August 5, 2025  Quantity: 30 tablet  Refills: 0            CONTINUE taking these medications        Instructions Prescription details   allopurinol 100 MG Tabs  Commonly known as: Zyloprim      Take 1 tablet (100 mg total) by mouth in the morning.   Refills: 0     cetirizine 10 MG Tabs  Commonly known as: ZyrTEC      Take 1 tablet (10 mg total) by mouth in the morning.   Refills: 0     DULoxetine 30 MG Cpep  Commonly known as: Cymbalta      Take 1 capsule (30 mg total) by mouth daily.   Quantity: 30 capsule  Refills: 3     levothyroxine 50 MCG  Tabs  Commonly known as: Synthroid      Take 1 tablet (50 mcg total) by mouth before breakfast.   Quantity: 90 tablet  Refills: 0     memantine 10 MG Tabs  Commonly known as: Namenda      Take 1 tablet (10 mg total) by mouth in the morning and 1 tablet (10 mg total) before bedtime.   Refills: 0     Multivitamin Adult Tabs      as directed Orally   Refills: 0     omeprazole 20 MG Cpdr  Commonly known as: PriLOSEC      Take 1 capsule (20 mg total) by mouth every morning.   Quantity: 90 capsule  Refills: 0     simvastatin 20 MG Tabs  Commonly known as: Zocor      TAKE 1 TABLET BY MOUTH  DAILY   Quantity: 90 tablet  Refills: 1     traZODone 50 MG Tabs  Commonly known as: Desyrel      TAKE 1 TABLET(50 MG) BY MOUTH EVERY NIGHT   Quantity: 30 tablet  Refills: 2     zolpidem 5 MG Tabs  Commonly known as: Ambien      Take 1 tablet (5 mg total) by mouth nightly as needed.   Refills: 0               Where to Get Your Medications        These medications were sent to [id number] - [place], [place] - [address] AT [facility] [address], [phone], [phone]  [id number], [facility] [address]      Phone: [phone]   donepezil 10 MG Tabs  losartan 25 MG Tabs  QUEtiapine 25 MG Tabs         [place]    Follow-up appointment:   Joann Lima DO  [address] [place] [address] [address]  [place] [address]  [phone]    Call in 1 week(s)      Milton Campos MD  [facility] [address] [place] [address]  [phone]    Call in 1 week(s)  For follow-up after hospitilization, As needed      Vital signs:       Physical Exam:  See exam from day of discharge note  -----------------------------------------------------------------------------------------------  PATIENT DISCHARGE INSTRUCTIONS: See electronic chart    Papa Chavira MD 7/7/2025    Time spent:  33 minutes

## 2025-07-16 RX ORDER — POTASSIUM CHLORIDE 1.5 G/1.58G
POWDER, FOR SOLUTION ORAL
Qty: 30 EACH | Refills: 11 | OUTPATIENT
Start: 2025-07-16

## 2025-07-21 PROBLEM — Z86.0100 HISTORY OF COLONIC POLYPS: Status: ACTIVE | Noted: 2025-07-21

## 2025-07-21 PROBLEM — K57.50 DIVERTICULAR DISEASE OF BOTH SMALL AND LARGE INTESTINE WITHOUT PERFORATION OR ABSCESS: Status: ACTIVE | Noted: 2025-07-21

## 2025-07-21 PROBLEM — K57.30 DIVERTICULAR DISEASE OF COLON: Status: ACTIVE | Noted: 2025-07-21

## 2025-07-21 PROBLEM — K22.10 ULCER OF ESOPHAGUS: Status: ACTIVE | Noted: 2025-07-21

## 2025-07-21 PROBLEM — R13.10 DYSPHAGIA: Status: ACTIVE | Noted: 2025-07-21

## 2025-07-21 PROBLEM — K64.8: Status: ACTIVE | Noted: 2025-07-21

## 2025-07-21 PROBLEM — K64.0 FIRST DEGREE HEMORRHOIDS: Status: ACTIVE | Noted: 2025-07-21

## 2025-07-21 PROBLEM — K21.00 GASTRO-ESOPHAGEAL REFLUX DISEASE WITH ESOPHAGITIS: Status: ACTIVE | Noted: 2025-07-21

## 2025-07-21 PROBLEM — K44.9 DIAPHRAGMATIC HERNIA: Status: ACTIVE | Noted: 2025-07-21

## 2025-08-13 RX ORDER — POTASSIUM CHLORIDE 1.5 G/1.58G
POWDER, FOR SOLUTION ORAL
Qty: 30 EACH | Refills: 11 | OUTPATIENT
Start: 2025-08-13